# Patient Record
Sex: FEMALE | Race: WHITE | Employment: STUDENT | URBAN - METROPOLITAN AREA
[De-identification: names, ages, dates, MRNs, and addresses within clinical notes are randomized per-mention and may not be internally consistent; named-entity substitution may affect disease eponyms.]

---

## 2020-12-21 ENCOUNTER — HOSPITAL ENCOUNTER (EMERGENCY)
Age: 20
Discharge: HOME | End: 2020-12-21
Payer: MEDICAID

## 2020-12-21 VITALS
DIASTOLIC BLOOD PRESSURE: 61 MMHG | OXYGEN SATURATION: 100 % | SYSTOLIC BLOOD PRESSURE: 107 MMHG | TEMPERATURE: 98.7 F | HEART RATE: 76 BPM | RESPIRATION RATE: 18 BRPM

## 2020-12-21 LAB
A/G RATIO: 1.6 (ref 1.1–2.2)
ALBUMIN SERPL-MCNC: 4.9 G/DL (ref 3.4–5)
ALP BLD-CCNC: 67 U/L (ref 40–129)
ALT SERPL-CCNC: 16 U/L (ref 10–40)
AMORPHOUS: ABNORMAL /HPF
ANION GAP SERPL CALCULATED.3IONS-SCNC: 12 MMOL/L (ref 3–16)
AST SERPL-CCNC: 19 U/L (ref 15–37)
BACTERIA: ABNORMAL /HPF
BASOPHILS ABSOLUTE: 0 K/UL (ref 0–0.2)
BASOPHILS RELATIVE PERCENT: 0.6 %
BILIRUB SERPL-MCNC: 0.5 MG/DL (ref 0–1)
BILIRUBIN URINE: NEGATIVE
BLOOD, URINE: ABNORMAL
BUN BLDV-MCNC: 15 MG/DL (ref 7–20)
CALCIUM SERPL-MCNC: 10 MG/DL (ref 8.3–10.6)
CHLORIDE BLD-SCNC: 103 MMOL/L (ref 99–110)
CLARITY: ABNORMAL
CO2: 24 MMOL/L (ref 21–32)
COLOR: YELLOW
CREAT SERPL-MCNC: 0.8 MG/DL (ref 0.6–1.1)
EOSINOPHILS ABSOLUTE: 0 K/UL (ref 0–0.6)
EOSINOPHILS RELATIVE PERCENT: 0.5 %
GFR AFRICAN AMERICAN: >60
GFR NON-AFRICAN AMERICAN: >60
GLOBULIN: 3.1 G/DL
GLUCOSE BLD-MCNC: 93 MG/DL (ref 70–99)
GLUCOSE URINE: NEGATIVE MG/DL
HCG QUALITATIVE: NEGATIVE
HCT VFR BLD CALC: 44.3 % (ref 36–48)
HEMOGLOBIN: 15.4 G/DL (ref 12–16)
KETONES, URINE: NEGATIVE MG/DL
LEUKOCYTE ESTERASE, URINE: ABNORMAL
LYMPHOCYTES ABSOLUTE: 1.5 K/UL (ref 1–5.1)
LYMPHOCYTES RELATIVE PERCENT: 18.3 %
MCH RBC QN AUTO: 32.8 PG (ref 26–34)
MCHC RBC AUTO-ENTMCNC: 34.8 G/DL (ref 31–36)
MCV RBC AUTO: 94.4 FL (ref 80–100)
MICROSCOPIC EXAMINATION: YES
MONOCYTES ABSOLUTE: 0.7 K/UL (ref 0–1.3)
MONOCYTES RELATIVE PERCENT: 8 %
MUCUS: ABNORMAL /LPF
NEUTROPHILS ABSOLUTE: 6.1 K/UL (ref 1.7–7.7)
NEUTROPHILS RELATIVE PERCENT: 72.6 %
NITRITE, URINE: NEGATIVE
PDW BLD-RTO: 12.2 % (ref 12.4–15.4)
PH UA: 7.5 (ref 5–8)
PLATELET # BLD: 327 K/UL (ref 135–450)
PMV BLD AUTO: 7.8 FL (ref 5–10.5)
POTASSIUM REFLEX MAGNESIUM: 3.7 MMOL/L (ref 3.5–5.1)
PROTEIN UA: NEGATIVE MG/DL
RBC # BLD: 4.69 M/UL (ref 4–5.2)
RBC UA: ABNORMAL /HPF (ref 0–4)
SODIUM BLD-SCNC: 139 MMOL/L (ref 136–145)
SPECIFIC GRAVITY UA: 1.02 (ref 1–1.03)
TOTAL PROTEIN: 8 G/DL (ref 6.4–8.2)
URINE TYPE: ABNORMAL
UROBILINOGEN, URINE: 0.2 E.U./DL
WBC # BLD: 8.4 K/UL (ref 4–11)
WBC UA: ABNORMAL /HPF (ref 0–5)

## 2020-12-21 PROCEDURE — 2580000003 HC RX 258: Performed by: PHYSICIAN ASSISTANT

## 2020-12-21 PROCEDURE — 84703 CHORIONIC GONADOTROPIN ASSAY: CPT

## 2020-12-21 PROCEDURE — 96375 TX/PRO/DX INJ NEW DRUG ADDON: CPT

## 2020-12-21 PROCEDURE — 80053 COMPREHEN METABOLIC PANEL: CPT

## 2020-12-21 PROCEDURE — 81001 URINALYSIS AUTO W/SCOPE: CPT

## 2020-12-21 PROCEDURE — 87077 CULTURE AEROBIC IDENTIFY: CPT

## 2020-12-21 PROCEDURE — 99284 EMERGENCY DEPT VISIT MOD MDM: CPT

## 2020-12-21 PROCEDURE — 96365 THER/PROPH/DIAG IV INF INIT: CPT

## 2020-12-21 PROCEDURE — 6360000002 HC RX W HCPCS: Performed by: PHYSICIAN ASSISTANT

## 2020-12-21 PROCEDURE — 85025 COMPLETE CBC W/AUTO DIFF WBC: CPT

## 2020-12-21 PROCEDURE — 87086 URINE CULTURE/COLONY COUNT: CPT

## 2020-12-21 RX ORDER — IBUPROFEN 600 MG/1
600 TABLET ORAL EVERY 6 HOURS PRN
Qty: 30 TABLET | Refills: 0 | Status: SHIPPED | OUTPATIENT
Start: 2020-12-21 | End: 2021-06-25 | Stop reason: ALTCHOICE

## 2020-12-21 RX ORDER — METHOCARBAMOL 750 MG/1
750 TABLET, FILM COATED ORAL 3 TIMES DAILY PRN
Qty: 10 TABLET | Refills: 0 | Status: SHIPPED | OUTPATIENT
Start: 2020-12-21 | End: 2021-06-25 | Stop reason: ALTCHOICE

## 2020-12-21 RX ORDER — 0.9 % SODIUM CHLORIDE 0.9 %
1000 INTRAVENOUS SOLUTION INTRAVENOUS ONCE
Status: COMPLETED | OUTPATIENT
Start: 2020-12-21 | End: 2020-12-21

## 2020-12-21 RX ORDER — KETOROLAC TROMETHAMINE 30 MG/ML
30 INJECTION, SOLUTION INTRAMUSCULAR; INTRAVENOUS ONCE
Status: COMPLETED | OUTPATIENT
Start: 2020-12-21 | End: 2020-12-21

## 2020-12-21 RX ORDER — CEFDINIR 300 MG/1
300 CAPSULE ORAL 2 TIMES DAILY
Qty: 14 CAPSULE | Refills: 0 | Status: SHIPPED | OUTPATIENT
Start: 2020-12-21 | End: 2020-12-28

## 2020-12-21 RX ADMIN — SODIUM CHLORIDE 1000 ML: 9 INJECTION, SOLUTION INTRAVENOUS at 15:55

## 2020-12-21 RX ADMIN — KETOROLAC TROMETHAMINE 30 MG: 30 INJECTION, SOLUTION INTRAMUSCULAR at 15:55

## 2020-12-21 RX ADMIN — CEFTRIAXONE SODIUM 1 G: 1 INJECTION, POWDER, FOR SOLUTION INTRAMUSCULAR; INTRAVENOUS at 16:23

## 2020-12-21 ASSESSMENT — ENCOUNTER SYMPTOMS
NAUSEA: 0
ABDOMINAL PAIN: 0
BACK PAIN: 0
SHORTNESS OF BREATH: 0
COUGH: 0
EYES NEGATIVE: 1
VOMITING: 0

## 2020-12-21 ASSESSMENT — PAIN SCALES - GENERAL
PAINLEVEL_OUTOF10: 5
PAINLEVEL_OUTOF10: 5
PAINLEVEL_OUTOF10: 3

## 2020-12-21 ASSESSMENT — PAIN DESCRIPTION - PAIN TYPE: TYPE: ACUTE PAIN

## 2020-12-21 ASSESSMENT — PAIN DESCRIPTION - LOCATION: LOCATION: FLANK

## 2020-12-21 NOTE — LETTER
[facility]  ED  [address]  [address]  Phone: [phone]  Fax: [phone]        December 22, 2020     Patient: Mandie Jenkins   YOB: 2000   Date of Visit: 12/21/2020       To Whom It May Concern: It is my medical opinion that Mandie Jenkins may return to work on 2/24/20. If you have any questions or concerns, please don't hesitate to call.     Sincerely,

## 2020-12-21 NOTE — ED PROVIDER NOTES
[address] [facility]  ED  EMERGENCY DEPARTMENT ENCOUNTER        Pt Name: Jeyson Whitt  MRN: [id number]  [place] 2000  Date of evaluation: 12/21/2020  Provider: Helen Hanks PA-C  PCP: Demetri Piña MD  ED Attending: Caleb Rodrigues MD      This patient was not seen by the attending provider    History provided by the patient    CHIEF COMPLAINT:     Chief Complaint   Patient presents with    Flank Pain     right sided, \"possible kidney stone\". HISTORY OF PRESENT ILLNESS:      Jeyson Whitt is a [address] y.o. female who arrives to the ED by private vehicle. Patient is here describing right flank pain that is been present for about 4 days. It is getting worse. She describes it as \"kidney pain\". She has had some urinary frequency and some slight dysuria. She has not noted any gross hematuria and she denies any abnormal vaginal bleeding or discharge. She denies any chest pain, abdominal pain, nausea or vomiting. She is here suspicious that she could possibly have a kidney stone (though she has never had one in the past). She has not taken anything for symptom control cannot identify exacerbating or alleviating factors to symptoms. Nursing Notes were reviewed     REVIEW OF SYSTEMS:     Review of Systems   Constitutional: Negative for appetite change, chills and fever. HENT: Negative. Eyes: Negative. Respiratory: Negative for cough and shortness of breath. Cardiovascular: Negative for chest pain. Gastrointestinal: Negative for abdominal pain, nausea and vomiting. Genitourinary: Positive for dysuria, flank pain and frequency. Negative for hematuria, vaginal bleeding and vaginal discharge. Musculoskeletal: Negative for back pain, gait problem and neck pain. Skin: Negative for rash. Neurological: Negative for dizziness and headaches. All other systems reviewed and are negative. Except as noted above in the ROS, all other systems were reviewed and negative.          PAST K/uL    Eosinophils Absolute 0.0 0.0 - 0.6 K/uL    Basophils Absolute 0.0 0.0 - 0.2 K/uL   Comprehensive Metabolic Panel w/ Reflex to MG   Result Value Ref Range    Sodium 139 136 - 145 mmol/L    Potassium reflex Magnesium 3.7 3.5 - 5.1 mmol/L    Chloride 103 99 - 110 mmol/L    CO2 24 21 - 32 mmol/L    Anion Gap 12 3 - 16    Glucose 93 70 - 99 mg/dL    BUN 15 7 - 20 mg/dL    CREATININE 0.8 0.6 - 1.1 mg/dL    GFR Non-African American >60 >60    GFR African American >60 >60    Calcium 10.0 8.3 - 10.6 mg/dL    Total Protein 8.0 6.4 - 8.2 g/dL    Alb 4.9 3.4 - 5.0 g/dL    Albumin/Globulin Ratio 1.6 1.1 - 2.2    Total Bilirubin 0.5 0.0 - 1.0 mg/dL    Alkaline Phosphatase 67 40 - 129 U/L    ALT 16 10 - 40 U/L    AST 19 15 - 37 U/L    Globulin 3.1 g/dL   Urinalysis, reflex to microscopic   Result Value Ref Range    Color, UA Yellow Straw/Yellow    Clarity, UA SL CLOUDY (A) Clear    Glucose, Ur Negative Negative mg/dL    Bilirubin Urine Negative Negative    Ketones, Urine Negative Negative mg/dL    Specific Gravity, UA 1.025 1.005 - 1.030    Blood, Urine MODERATE (A) Negative    pH, UA 7.5 5.0 - 8.0    Protein, UA Negative Negative mg/dL    Urobilinogen, Urine 0.2 <2.0 E.U./dL    Nitrite, Urine Negative Negative    Leukocyte Esterase, Urine MODERATE (A) Negative    Microscopic Examination YES     Urine Type NotGiven    HCG Qualitative, Serum   Result Value Ref Range    hCG Qual Negative Detects HCG level >10 MIU/mL   Microscopic Urinalysis   Result Value Ref Range    Mucus, UA Rare (A) None Seen /LPF    WBC, UA 10-20 (A) 0 - 5 /HPF    RBC, UA 21-50 (A) 0 - 4 /HPF    Bacteria, UA 1+ (A) None Seen /HPF    Amorphous, UA 2+ /HPF           PROCEDURES:   N/A    CRITICAL CARE TIME:       None      CONSULTS:  None      EMERGENCY DEPARTMENT COURSE and DIFFERENTIAL DIAGNOSIS/MDM:   Vitals:    Vitals:    12/21/20 1515 12/21/20 1534 12/21/20 1537 12/21/20 1637   BP:  (!) 156/100  107/61   Pulse: 111 120  76   Resp:  18     Temp:   98.7 °F (37.1 °C)    TempSrc:   Oral    SpO2: 97% 100%  100%       Patient was given the following medications:  Medications   0.9 % sodium chloride bolus (1,000 mLs Intravenous New Bag 12/21/20 1555)   cefTRIAXone (ROCEPHIN) 1 g IVPB in 50 mL D5W minibag (1 g Intravenous New Bag 12/21/20 1623)   ketorolac (TORADOL) injection 30 mg (30 mg Intravenous Given 12/21/20 1555)         I have evaluated this patient in the ED. Old records were reviewed. Patient arrives to the ED describing right flank pain for 4 days as well as some urinary symptoms. She has mild right CVA tenderness on physical exam but otherwise unremarkable exam.  She arrives to the ED tachycardic but this quickly resolves once she settles in the room. I checked her multiple times and found her heart rate to be in the 70s. Urine is collected on the patient which initially looks infected with moderate blood and moderate leukocytes. Micro analysis shows just 1+ bacteria would not really know WBC or RBC. I am going to send a urine culture. CBC is normal with white count of 8.4, H&H 15.4 and 44.3  CMP normal  hCG negative  Patient received 1 L normal saline IV with Toradol 30 mg IV and is feeling better. If anything her pain is worse when she moves around and there may indeed be a musculoskeletal component. Due to the initial appearance of her urine dip I did order Rocephin 1 g IV. I am going to cover her with antibiotics for suspected developing urinary tract infection and possibly early pyelonephritis. I did warn her to come back if you develop worsening pain, fevers, nausea or vomiting. She acknowledges understanding of this. I do not see an indication to image her at this point. She does not meet sepsis criteria or in any way look acutely ill. Her urine does not show any hematuria and the microanalysis admittedly is less concerning for infection compared to the initial dip.   I will treat with NSAIDs and muscle relaxer possible musculoskeletal component and answered all questions for her before discharge  I estimate there is LOW risk for ACUTE APPENDICITIS, BOWEL OBSTRUCTION, CHOLECYSTITIS, DIVERTICULITIS, INCARCERATED HERNIA, PANCREATITIS, PELVIC INFLAMMATORY DISEASE, PERFORATED BOWEL or ULCER, PREGNANCY/ECTOPIC PREGNANCY, or TUBO-OVARIAN ABSCESS, thus I consider the discharge disposition reasonable. Also, there is no evidence or peritonitis, sepsis, or toxicity. Allen Blanco and I have discussed the diagnosis and risks, and we agree with discharging home to follow-up with their primary doctor. We also discussed returning to the Emergency Department immediately if new or worsening symptoms occur. We have discussed the symptoms which are most concerning (e.g., bloody stool, fever, changing or worsening pain, vomiting) that necessitate immediate return. FINAL IMPRESSION:      1. Pyelonephritis    2.  Right flank pain          DISPOSITION/PLAN:   DISPOSITION     DISCHARGE    PATIENT REFERRED TO:  Salvador Cheek MD  [address] [address] [address]  [phone]    Schedule an appointment as soon as possible for a visit       [facility]  [address]  Go to   If symptoms worsen--worsening pain, fevers, vomiting      DISCHARGE MEDICATIONS:  New Prescriptions    CEFDINIR (OMNICEF) 300 MG CAPSULE    Take 1 capsule by mouth 2 times daily for 7 days                  (Please note thatportions of this note were completed with a voice recognition program.  Efforts were made to edit the dictations, but occasionally words are mis-transcribed.)    Taylor Gonzáles PA-C (electronicallysigned)              Larry Gaytan, [address]  12/21/20 [id number]

## 2020-12-22 LAB
ORGANISM: ABNORMAL
URINE CULTURE, ROUTINE: ABNORMAL

## 2021-06-25 ENCOUNTER — OFFICE VISIT (OUTPATIENT)
Dept: FAMILY MEDICINE CLINIC | Age: 21
End: 2021-06-25
Payer: MEDICAID

## 2021-06-25 VITALS
WEIGHT: 163 LBS | HEART RATE: 100 BPM | HEIGHT: 65 IN | BODY MASS INDEX: 27.16 KG/M2 | OXYGEN SATURATION: 99 % | SYSTOLIC BLOOD PRESSURE: 120 MMHG | DIASTOLIC BLOOD PRESSURE: 82 MMHG

## 2021-06-25 DIAGNOSIS — E04.9 ENLARGED THYROID: ICD-10-CM

## 2021-06-25 DIAGNOSIS — R14.0 ABDOMINAL BLOATING: ICD-10-CM

## 2021-06-25 DIAGNOSIS — Z76.89 ENCOUNTER TO ESTABLISH CARE: Primary | ICD-10-CM

## 2021-06-25 DIAGNOSIS — Z30.42 ENCOUNTER FOR SURVEILLANCE OF INJECTABLE CONTRACEPTIVE: ICD-10-CM

## 2021-06-25 LAB
CONTROL: NORMAL
PREGNANCY TEST URINE, POC: NORMAL

## 2021-06-25 PROCEDURE — 96372 THER/PROPH/DIAG INJ SC/IM: CPT | Performed by: PHYSICIAN ASSISTANT

## 2021-06-25 PROCEDURE — 81025 URINE PREGNANCY TEST: CPT | Performed by: PHYSICIAN ASSISTANT

## 2021-06-25 PROCEDURE — 99385 PREV VISIT NEW AGE 18-39: CPT | Performed by: PHYSICIAN ASSISTANT

## 2021-06-25 RX ORDER — MEDROXYPROGESTERONE ACETATE 150 MG/ML
150 INJECTION, SUSPENSION INTRAMUSCULAR ONCE
Status: COMPLETED | OUTPATIENT
Start: 2021-06-25 | End: 2021-06-25

## 2021-06-25 RX ADMIN — MEDROXYPROGESTERONE ACETATE 150 MG: 150 INJECTION, SUSPENSION INTRAMUSCULAR at 09:08

## 2021-06-25 SDOH — ECONOMIC STABILITY: FOOD INSECURITY: WITHIN THE PAST 12 MONTHS, YOU WORRIED THAT YOUR FOOD WOULD RUN OUT BEFORE YOU GOT MONEY TO BUY MORE.: NEVER TRUE

## 2021-06-25 SDOH — ECONOMIC STABILITY: FOOD INSECURITY: WITHIN THE PAST 12 MONTHS, THE FOOD YOU BOUGHT JUST DIDN'T LAST AND YOU DIDN'T HAVE MONEY TO GET MORE.: NEVER TRUE

## 2021-06-25 ASSESSMENT — ENCOUNTER SYMPTOMS
SHORTNESS OF BREATH: 0
WHEEZING: 0
BLOOD IN STOOL: 0
ABDOMINAL PAIN: 0
DIARRHEA: 0
COUGH: 0
ABDOMINAL DISTENTION: 1
COLOR CHANGE: 0

## 2021-06-25 ASSESSMENT — SOCIAL DETERMINANTS OF HEALTH (SDOH): HOW HARD IS IT FOR YOU TO PAY FOR THE VERY BASICS LIKE FOOD, HOUSING, MEDICAL CARE, AND HEATING?: NOT HARD AT ALL

## 2021-06-25 ASSESSMENT — PATIENT HEALTH QUESTIONNAIRE - PHQ9
SUM OF ALL RESPONSES TO PHQ9 QUESTIONS 1 & 2: 0
SUM OF ALL RESPONSES TO PHQ QUESTIONS 1-9: 0
SUM OF ALL RESPONSES TO PHQ QUESTIONS 1-9: 0
1. LITTLE INTEREST OR PLEASURE IN DOING THINGS: 0
SUM OF ALL RESPONSES TO PHQ QUESTIONS 1-9: 0
2. FEELING DOWN, DEPRESSED OR HOPELESS: 0

## 2021-06-25 NOTE — PROGRESS NOTES
 Smokeless tobacco: Never Used   Vaping Use    Vaping Use: Never used   Substance and Sexual Activity    Alcohol use: No     Alcohol/week: 0.0 standard drinks    Drug use: No    Sexual activity: Yes     Partners: Male   Other Topics Concern    Not on file   Social History Narrative    Not on file     Social Determinants of Health     Financial Resource Strain: Low Risk     Difficulty of Paying Living Expenses: Not hard at all   Food Insecurity: No Food Insecurity    Worried About Running Out of Food in the Last Year: Never true    [address] in the Last Year: Never true   Transportation Needs:     Lack of Transportation (Medical):  Lack of Transportation (Non-Medical):    Physical Activity:     Days of Exercise per Week:     Minutes of Exercise per Session:    Stress:     Feeling of Stress :    Social Connections:     Frequency of Communication with Friends and Family:     Frequency of Social Gatherings with Friends and Family:     Attends Hoah[employer] Services:     Active Member of Clubs or Organizations:     Attends Club or Organization Meetings:     Marital Status:    Intimate Partner Violence:     Fear of Current or Ex-Partner:     Emotionally Abused:     Physically Abused:     Sexually Abused:         Family History   Problem Relation Age of Onset    Rheum Arthritis Mother        ADVANCE DIRECTIVE: N, <no information>    Vitals:    06/25/21 0836   BP: 120/82   Pulse: 100   SpO2: 99%   Weight: 163 lb (73.9 kg)   Height: 5' 5\" (1.651 m)     Estimated body mass index is 27.12 kg/m² as calculated from the following:    Height as of this encounter: 5' 5\" (1.651 m). Weight as of this encounter: 163 lb (73.9 kg). Physical Exam  Vitals reviewed. Constitutional:       Appearance: Normal appearance. HENT:      Head: Normocephalic and atraumatic. Neck:      Thyroid: Thyromegaly present. Cardiovascular:      Rate and Rhythm: Normal rate and regular rhythm.       Heart sounds: Normal heart sounds. Pulmonary:      Effort: Pulmonary effort is normal.      Breath sounds: Normal breath sounds. No wheezing or rhonchi. Abdominal:      General: Bowel sounds are normal.      Palpations: Abdomen is soft. Tenderness: There is no abdominal tenderness. Musculoskeletal:      Right lower leg: No edema. Left lower leg: No edema. Neurological:      Mental Status: She is alert and oriented to person, place, and time. Cranial Nerves: No cranial nerve deficit. Psychiatric:         Mood and Affect: Mood normal.         Behavior: Behavior normal.         Thought Content: Thought content normal.         Judgment: Judgment normal.         No flowsheet data found. Lab Results   Component Value Date    GLUCOSE 93 12/21/2020       The ASCVD Risk score (Marylu Murillo, et al., 2013) failed to calculate for the following reasons: The 2013 ASCVD risk score is only valid for ages 36 to 78      There is no immunization history on file for this patient. Health Maintenance   Topic Date Due    HPV vaccine (1 - 2-dose series) Never done    COVID-19 Vaccine (1) Never done    Hepatitis C screen  06/25/2022 (Originally 2000)    HIV screen  06/25/2022 (Originally 11/28/2015)    Chlamydia screen  06/25/2022 (Originally 11/28/2016)    Flu vaccine (Season Ended) 09/01/2021    DTaP/Tdap/Td vaccine (7 - Td or Tdap) 05/15/2023    Hepatitis B vaccine  Completed    Hib vaccine  Completed    Varicella vaccine  Completed    Meningococcal (ACWY) vaccine  Completed    Hepatitis A vaccine  Aged Out    Pneumococcal 0-64 years Vaccine  Aged Out          ASSESSMENT/PLAN:  1. Encounter to establish care        I reviewed her history    2. Enlarged thyroid  -     US THYROID; Future  -     Discussed the findings of exam, will further work up with [address]. I suspect a possible right sided cyst    3. Abdominal bloating  -     Celiac Screen with Reflex; Future    4.  Encounter for surveillance of injectable contraceptive  -     POCT urine pregnancy        -     Pt will need to stop her depo injection at the end of this year and switch to alternative therapy. She is aware of this    Return if symptoms worsen or fail to improve. An electronic signature was used to authenticate this note.     --SERGO Khan on 6/25/2021 at 9:18 AM

## 2021-06-25 NOTE — PATIENT INSTRUCTIONS
Patient Education        HPV (Human Papillomavirus) Vaccine Gardasil®: What You Need to Know  What is HPV? Genital human papillomavirus (HPV) is the most common sexually transmitted virus in the [place]. More than half of sexually active men and women are infected with HPV at some time in their lives. About 20 million Americans are currently infected, and about 6 million more get infected each year. HPV is usually spread through sexual contact. Most HPV infections don't cause any symptoms, and go away on their own. But HPV can cause cervical cancer in women. Cervical cancer is the 2nd leading cause of cancer deaths among women around the world. In the [place], about 12,000 women get cervical cancer every year and about 4,000 are expected to die from it. HPV is also associated with several less common cancers, such as vaginal and vulvar cancers in women, and anal and oropharyngeal (back of the throat, including base of tongue and tonsils) cancers in both men and women. HPV can also cause genital warts and warts in the throat. There is no cure for HPV infection, but some of the problems it causes can be treated. HPV vaccine-Why get vaccinated? The HPV vaccine you are getting is one of two vaccines that can be given to prevent HPV. It may be given to both males and females. This vaccine can prevent most cases of cervical cancer in females, if it is given before exposure to the virus. In addition, it can prevent vaginal and vulvar cancer in females, and genital warts and anal cancer in both males and females. Protection from HPV vaccine is expected to be long-lasting. But vaccination is not a substitute for cervical cancer screening. Women should still get regular Pap tests. Who should get this HPV vaccine and when?   HPV vaccine is given as a 3-dose series  · 1st Dose: Now  · 2nd Dose: 1 to 2 months after Dose 1  · 3rd Dose: 6 months after Dose 1  Additional (booster) doses are not recommended. Routine vaccination  · This HPV vaccine is recommended for girls and boys 6or 15years of age. It may be given starting at age 5. Why is HPV vaccine recommended at 6or 15years of age? HPV infection is easily acquired, even with only one sex partner. That is why it is important to get HPV vaccine before any sexual contact takes place. Also, response to the vaccine is better at this age than at older ages. Catch-up vaccination  This vaccine is recommended for the following people who have not completed the 3-dose series:  · Females 15 through 32years of age  · Males 15 through 24years of age  This vaccine may be given to men 25 through 32years of age who have not completed the 3-dose series. It is recommended for men through age 32 who have sex with men or whose immune system is weakened because of HIV infection, other illness, or medications. HPV vaccine may be given at the same time as other vaccines. Some people should not get HPV vaccine or should wait  · Anyone who has ever had a life-threatening allergic reaction to any component of HPV vaccine, or to a previous dose of HPV vaccine, should not get the vaccine. Tell your doctor if the person getting vaccinated has any severe allergies, including an allergy to yeast.  · HPV vaccine is not recommended for pregnant women. However, receiving HPV vaccine when pregnant is not a reason to consider terminating the pregnancy. Women who are breast feeding may get the vaccine. · People who are mildly ill when a dose of HPV vaccine is planned can still be vaccinated. People with a moderate or severe illness should wait until they are better. What are the risks from this vaccine? This HPV vaccine has been used in the [place] and around the world for about six years and has been very safe. However, any medicine could possibly cause a serious problem, such as a severe allergic reaction.  The risk of any vaccine causing a serious injury, or death, is extremely small. Life-threatening allergic reactions from vaccines are very rare. If they do occur, it would be within a few minutes to a few hours after the vaccination. Several mild to moderate problems are known to occur with this HPV vaccine. These do not last long and go away on their own. · Reactions in the arm where the shot was given:  ? Pain (about 8 people in 10)  ? Redness or swelling (about 1 person in 4)  · Fever  ? Mild (100°F) (about 1 person in 10)  ? Moderate (102°F) (about 1 person in 65)  · Other problems:  ? Headache (about 1 person in 3)  · Fainting: Brief fainting spells and related symptoms (such as jerking movements) can happen after any medical procedure, including vaccination. Sitting or lying down for about 15 minutes after a vaccination can help prevent fainting and injuries caused by falls. Tell your doctor if the patient feels dizzy or light-headed, or has vision changes or ringing in the ears. Like all vaccines, HPV vaccines will continue to be monitored for unusual or severe problems. What if there is a serious reaction? What should I look for? · Look for anything that concerns you, such as signs of a severe allergic reaction, very high fever, or behavior changes. Signs of a severe allergic reaction can include hives, swelling of the face and throat, difficulty breathing, a fast heartbeat, dizziness, and weakness. These would start a few minutes to a few hours after the vaccination. What should I do? · If you think it is a severe allergic reaction or other emergency that can't wait, call 9-1-1 or get the person to the nearest hospital. Otherwise, call your doctor. · Afterward, the reaction should be reported to the Vaccine Adverse Event Reporting System (VAERS). Your doctor might file this report, or you can do it yourself through the VAERS web site at www.vaers. hhs.gov, or by calling 8-535.333.2031. VAERS is only for reporting reactions.  They do not give medical advice. The National Vaccine Injury Compensation Program  The National Vaccine Injury Compensation Program (VICP) is a federal program that was created to compensate people who may have been injured by certain vaccines. Persons who believe they may have been injured by a vaccine can learn about the program and about filing a claim by calling 0-961.934.1005 or visiting the [employer] website at www.[facility].gov/vaccinecompensation. How can I learn more? · Ask your doctor. · Call your local or state health department. · Contact the Centers for Disease Control and Prevention (CDC):  ? Call 9-343.224.7793 (1-800-CDC-INFO) or  ? Visit the CDC's website at www.cdc.gov/vaccines. Vaccine Information Statement (Interim)  HPV Vaccine (Gardasil)  (5/17/2013)  [address] [id number]  Department of Health and Human Services  Centers for Disease Control and Prevention  Many Vaccine Information Statements are available in [place] and other languages. See www.immunize.org/vis. Muchas hojas de información sobre vacunas están disponibles en español y en otros idiomas. Visite www.immunize.org/vis. Care instructions adapted under license by [facility] ([facility]). If you have questions about a medical condition or this instruction, always ask your healthcare professional. [address] any warranty or liability for your use of this information.

## 2021-06-28 ENCOUNTER — NURSE ONLY (OUTPATIENT)
Dept: FAMILY MEDICINE CLINIC | Age: 21
End: 2021-06-28
Payer: MEDICAID

## 2021-06-28 ENCOUNTER — HOSPITAL ENCOUNTER (OUTPATIENT)
Dept: ULTRASOUND IMAGING | Age: 21
Discharge: HOME | End: 2021-06-28
Payer: MEDICAID

## 2021-06-28 DIAGNOSIS — R14.0 ABDOMINAL BLOATING: ICD-10-CM

## 2021-06-28 DIAGNOSIS — Z23 NEED FOR TUBERCULOSIS VACCINATION: Primary | ICD-10-CM

## 2021-06-28 DIAGNOSIS — E04.9 ENLARGED THYROID: ICD-10-CM

## 2021-06-28 LAB — IGA: 150 MG/DL (ref 70–400)

## 2021-06-28 PROCEDURE — 76536 US EXAM OF HEAD AND NECK: CPT

## 2021-06-28 PROCEDURE — 86580 TB INTRADERMAL TEST: CPT | Performed by: PHYSICIAN ASSISTANT

## 2021-06-29 LAB — TISSUE TRANSGLUTAMINASE IGA: <0.5 U/ML (ref 0–14)

## 2021-06-30 ENCOUNTER — TELEPHONE (OUTPATIENT)
Dept: FAMILY MEDICINE CLINIC | Age: 21
End: 2021-06-30

## 2021-06-30 ENCOUNTER — NURSE ONLY (OUTPATIENT)
Dept: FAMILY MEDICINE CLINIC | Age: 21
End: 2021-06-30

## 2021-06-30 DIAGNOSIS — Z11.1 ENCOUNTER FOR PPD SKIN TEST READING: Primary | ICD-10-CM

## 2021-06-30 LAB
INDURATION: 0
TB SKIN TEST: NORMAL

## 2021-06-30 NOTE — TELEPHONE ENCOUNTER
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 She is going to [employer]. She will pick it up when it is ready.

## 2021-07-06 ENCOUNTER — NURSE ONLY (OUTPATIENT)
Dept: FAMILY MEDICINE CLINIC | Age: 21
End: 2021-07-06
Payer: MEDICAID

## 2021-07-06 DIAGNOSIS — Z23 NEED FOR TUBERCULOSIS VACCINATION: Primary | ICD-10-CM

## 2021-07-06 PROCEDURE — 86580 TB INTRADERMAL TEST: CPT | Performed by: PHYSICIAN ASSISTANT

## 2021-07-08 LAB
INDURATION: NORMAL
TB SKIN TEST: NEGATIVE

## 2022-03-01 ENCOUNTER — NURSE TRIAGE (OUTPATIENT)
Dept: OTHER | Facility: CLINIC | Age: 22
End: 2022-03-01

## 2022-03-01 ENCOUNTER — TELEPHONE (OUTPATIENT)
Dept: FAMILY MEDICINE CLINIC | Age: 22
End: 2022-03-01

## 2022-03-01 NOTE — TELEPHONE ENCOUNTER
[facility]. Limited triage due to caller not with patient     Subjective: Caller Mom  states \"he called me on her lunch today. Always had anxiety when drives on interstate and real bad anxiety big exams. 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 Her heart rate was 150 (apple watch) , when standing in clinicals was in 40's . 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    \"     Current Symptoms: mom reports daughter heart rate is increased when driving to clinicals and schools. Mom reports she spoke with her at lunch time and patient was doing okay. Intermittent waves of heart rate going up and down. When anxious pt's mom reports heart rate up. Mom reports when the heart rate comes down gets to low feels weak like going to pass out and get sick then when heart rate comes back up where suppose to be it subsided. Mom reports last time she talked to her she was fine in the moment. Onset:mom reports any high stress situation this occurs such as driving on interstate     Associated Symptoms: increased anixety     Pain Severity: mom reports she gets tightness and pain in chest     Temperature: mom reports no fever     What has been tried: na    LMP: NA Pregnant: NA    Recommended disposition: Go to ED/[facility] Now (Or to Office with PCP Approval)    Care advice provided, patient verbalizes understanding; denies any other questions or concerns; instructed to call back for any new or worsening symptoms. Writer provided warm transfer to [employer]  at [facility] for further assessment and resume of care      Attention Provider: Thank you for allowing me to participate in the care of your patient. The patient was connected to triage in response to information provided to the ECC/PSC.   Please do not respond through this encounter as the response is not directed to a shared pool. Reason for Disposition   Patient sounds very sick or weak to the triager     Mom reporting patient having increased anxiety when driving to clinicals /school on interstate during these episodes has chest tightness and feels like can a pass out , once stressful situation is over will resolve.     Protocols used: ANXIETY AND PANIC ATTACK-ADULT-[place]

## 2022-03-01 NOTE — TELEPHONE ENCOUNTER
----- Message from Cheyenne Crump sent at 3/1/2022  1:51 PM EST -----  Subject: Message to Provider    QUESTIONS  Information for Provider? Pt would like to schedule a virtual appt for   anxiety and heart palpitations ect. Would like to speak to PCP as soon as   possible and possibly get some RX, tried to book appt on [employer] and the   earliest appt was on 3/27/22. Did transfer mother to Nurse Triage but   sending message just in case.   ---------------------------------------------------------------------------  --------------  CALL BACK INFO  What is the best way for the office to contact you? OK to leave message on   voicemail  Preferred Call Back Phone Number? [phone]  ---------------------------------------------------------------------------  --------------  SCRIPT ANSWERS  Relationship to Patient?  Self

## 2022-03-03 ENCOUNTER — OFFICE VISIT (OUTPATIENT)
Dept: FAMILY MEDICINE CLINIC | Age: 22
End: 2022-03-03
Payer: MEDICAID

## 2022-03-03 VITALS
DIASTOLIC BLOOD PRESSURE: 80 MMHG | SYSTOLIC BLOOD PRESSURE: 120 MMHG | BODY MASS INDEX: 27.12 KG/M2 | OXYGEN SATURATION: 100 % | HEART RATE: 97 BPM | WEIGHT: 163 LBS

## 2022-03-03 DIAGNOSIS — R07.9 CHEST PAIN, UNSPECIFIED TYPE: ICD-10-CM

## 2022-03-03 DIAGNOSIS — R00.0 TACHYCARDIA: Primary | ICD-10-CM

## 2022-03-03 LAB
ANION GAP SERPL CALCULATED.3IONS-SCNC: 14 MMOL/L (ref 3–16)
BUN BLDV-MCNC: 7 MG/DL (ref 7–20)
CALCIUM SERPL-MCNC: 9.5 MG/DL (ref 8.3–10.6)
CHLORIDE BLD-SCNC: 102 MMOL/L (ref 99–110)
CO2: 21 MMOL/L (ref 21–32)
CREAT SERPL-MCNC: 0.7 MG/DL (ref 0.6–1.1)
D DIMER: <200 NG/ML DDU (ref 0–229)
GFR AFRICAN AMERICAN: >60
GFR NON-AFRICAN AMERICAN: >60
GLUCOSE BLD-MCNC: 103 MG/DL (ref 70–99)
HCT VFR BLD CALC: 44 % (ref 36–48)
HEMOGLOBIN: 15 G/DL (ref 12–16)
MCH RBC QN AUTO: 32.4 PG (ref 26–34)
MCHC RBC AUTO-ENTMCNC: 34.2 G/DL (ref 31–36)
MCV RBC AUTO: 94.9 FL (ref 80–100)
PDW BLD-RTO: 12.8 % (ref 12.4–15.4)
PLATELET # BLD: 368 K/UL (ref 135–450)
PMV BLD AUTO: 7.7 FL (ref 5–10.5)
POTASSIUM SERPL-SCNC: 3.7 MMOL/L (ref 3.5–5.1)
RBC # BLD: 4.63 M/UL (ref 4–5.2)
SODIUM BLD-SCNC: 137 MMOL/L (ref 136–145)
TSH REFLEX: 2.34 UIU/ML (ref 0.27–4.2)
WBC # BLD: 5.2 K/UL (ref 4–11)

## 2022-03-03 PROCEDURE — 36415 COLL VENOUS BLD VENIPUNCTURE: CPT | Performed by: PHYSICIAN ASSISTANT

## 2022-03-03 PROCEDURE — G8419 CALC BMI OUT NRM PARAM NOF/U: HCPCS | Performed by: PHYSICIAN ASSISTANT

## 2022-03-03 PROCEDURE — G8484 FLU IMMUNIZE NO ADMIN: HCPCS | Performed by: PHYSICIAN ASSISTANT

## 2022-03-03 PROCEDURE — 1036F TOBACCO NON-USER: CPT | Performed by: PHYSICIAN ASSISTANT

## 2022-03-03 PROCEDURE — 93000 ELECTROCARDIOGRAM COMPLETE: CPT | Performed by: PHYSICIAN ASSISTANT

## 2022-03-03 PROCEDURE — G8427 DOCREV CUR MEDS BY ELIG CLIN: HCPCS | Performed by: PHYSICIAN ASSISTANT

## 2022-03-03 PROCEDURE — 99214 OFFICE O/P EST MOD 30 MIN: CPT | Performed by: PHYSICIAN ASSISTANT

## 2022-03-03 ASSESSMENT — ENCOUNTER SYMPTOMS
WHEEZING: 0
SHORTNESS OF BREATH: 1
CHEST TIGHTNESS: 1
COUGH: 0

## 2022-03-03 NOTE — PROGRESS NOTES
Ahmet Gallo (:  2000) is a 24 y.o. female,Established patient, here for evaluation of the following chief complaint(s):  Tachycardia (states HR is about 118 while sitting and if shes driving it goes up to 150 )         ASSESSMENT/PLAN:  1. Tachycardia  -     EKG 12 Lead; Future: normal  -     Basic Metabolic Panel  -     CBC  -     TSH with Reflex  -     D-Dimer, Quantitative  2. Chest pain, unspecified type  -     D-Dimer, Quantitative      Return if symptoms worsen or fail to improve. Subjective   SUBJECTIVE/OBJECTIVE:  HPI  The pt is here for evaluation of tachycardia  Symptoms started  Characteristics: chest tightness and sharp pain on the left side, occurs at rest and with activity  Frequency: daily symptoms to varying degrees  Associated symptoms: chest tightness, HR of 118 at rest, hard to catch her breath  Denies: lightheadedness, dizziness, swelling  Treatments tried: has increased water, recently cut gluten out of her diet, exercise regularly, cut back on soda  Risk factors: anxiety    Review of Systems   Constitutional: Negative for diaphoresis, fatigue and unexpected weight change. Respiratory: Positive for chest tightness and shortness of breath. Negative for cough and wheezing. Cardiovascular: Positive for chest pain and palpitations. Negative for leg swelling. Neurological: Negative for dizziness, light-headedness and headaches. Objective   Physical Exam  Vitals reviewed. Constitutional:       Appearance: Normal appearance. She is normal weight. Cardiovascular:      Rate and Rhythm: Normal rate and regular rhythm. Heart sounds: Normal heart sounds. Pulmonary:      Effort: Pulmonary effort is normal.      Breath sounds: Normal breath sounds. No wheezing or rhonchi. Musculoskeletal:      Right lower leg: No edema. Left lower leg: No edema. Neurological:      Mental Status: She is alert.                 An electronic signature was used to authenticate this note.     --SERGO Hartman

## 2022-03-04 DIAGNOSIS — R00.0 TACHYCARDIA: Primary | ICD-10-CM

## 2022-03-07 ENCOUNTER — HOSPITAL ENCOUNTER (OUTPATIENT)
Dept: NON INVASIVE DIAGNOSTICS | Age: 22
Discharge: HOME | End: 2022-03-07
Payer: MEDICAID

## 2022-03-07 DIAGNOSIS — R00.0 TACHYCARDIA: ICD-10-CM

## 2022-03-07 PROCEDURE — 93226 XTRNL ECG REC<48 HR SCAN A/R: CPT

## 2022-03-07 PROCEDURE — 93225 XTRNL ECG REC<48 HRS REC: CPT

## 2022-03-11 ENCOUNTER — PATIENT MESSAGE (OUTPATIENT)
Dept: FAMILY MEDICINE CLINIC | Age: 22
End: 2022-03-11

## 2022-03-11 LAB
ACQUISITION DURATION: NORMAL S
AVERAGE HEART RATE: 98 BPM
EKG DIAGNOSIS: NORMAL
HOLTER MAX HEART RATE: 160 BPM
HOOKUP DATE: NORMAL
HOOKUP TIME: NORMAL
MAX HEART RATE TIME/DATE: NORMAL
MIN HEART RATE TIME/DATE: NORMAL
MIN HEART RATE: 63 BPM
NUMBER OF QRS COMPLEXES: NORMAL
NUMBER OF SUPRAVENTRICULAR COUPLETS: 0
NUMBER OF SUPRAVENTRICULAR ECTOPICS: 7
NUMBER OF SUPRAVENTRICULAR ISOLATED BEATS: 7
NUMBER OF VENTRICULAR BIGEMINAL CYCLES: 0
NUMBER OF VENTRICULAR COUPLETS: 1
NUMBER OF VENTRICULAR ECTOPICS: 16

## 2022-03-14 ENCOUNTER — TELEMEDICINE (OUTPATIENT)
Dept: FAMILY MEDICINE CLINIC | Age: 22
End: 2022-03-14
Payer: MEDICAID

## 2022-03-14 DIAGNOSIS — R00.0 TACHYCARDIA: Primary | ICD-10-CM

## 2022-03-14 PROCEDURE — G8484 FLU IMMUNIZE NO ADMIN: HCPCS | Performed by: PHYSICIAN ASSISTANT

## 2022-03-14 PROCEDURE — G8419 CALC BMI OUT NRM PARAM NOF/U: HCPCS | Performed by: PHYSICIAN ASSISTANT

## 2022-03-14 PROCEDURE — 1036F TOBACCO NON-USER: CPT | Performed by: PHYSICIAN ASSISTANT

## 2022-03-14 PROCEDURE — G8427 DOCREV CUR MEDS BY ELIG CLIN: HCPCS | Performed by: PHYSICIAN ASSISTANT

## 2022-03-14 PROCEDURE — 99214 OFFICE O/P EST MOD 30 MIN: CPT | Performed by: PHYSICIAN ASSISTANT

## 2022-03-14 RX ORDER — METOPROLOL SUCCINATE 25 MG/1
25 TABLET, EXTENDED RELEASE ORAL DAILY
Qty: 30 TABLET | Refills: 5 | Status: SHIPPED | OUTPATIENT
Start: 2022-03-14 | End: 2022-07-29 | Stop reason: ALTCHOICE

## 2022-03-14 ASSESSMENT — ENCOUNTER SYMPTOMS
SHORTNESS OF BREATH: 0
WHEEZING: 0

## 2022-03-14 NOTE — PROGRESS NOTES
3/14/2022    TELEHEALTH EVALUATION -- Audio/Visual (During XWLBL-21 public health emergency)    HPI:    Prince Vasquez (:  2000) has requested an audio/video evaluation for the following concern(s):    Sinus tachycardia, confirmed on holter monitor. Pt reported today that she has also been having low HR at times where she is beating less than 60 BPM, typically after standing for long periods of time. Lowest FL on holter monitor was 58. Occurring during times of symptoms  Characteristics: chest tightness and sharp pain on the left side, occurs at rest and with activity  Frequency: daily symptoms to varying degrees  Associated symptoms: chest tightness, HR of 118 at rest, hard to catch her breath  Denies: lightheadedness, dizziness, swelling  Past treatments: none  Father diagnosed with MVP at age 16      Review of Systems   Constitutional: Negative for diaphoresis, fatigue and unexpected weight change. Respiratory: Negative for shortness of breath and wheezing. Cardiovascular: Negative for chest pain, palpitations and leg swelling. Neurological: Negative for dizziness, weakness and headaches. Prior to Visit Medications    Medication Sig Taking?  Authorizing Provider   metoprolol succinate (TOPROL XL) 25 MG extended release tablet Take 1 tablet by mouth daily Yes SERGO Ramirez   medroxyPROGESTERone (DEPO-PROVERA) 150 MG/ML injection Inject 150 mg into the muscle every 3 months Yes Historical Provider, MD       Social History     Tobacco Use    Smoking status: Never Smoker    Smokeless tobacco: Never Used   Vaping Use    Vaping Use: Never used   Substance Use Topics    Alcohol use: No     Alcohol/week: 0.0 standard drinks    Drug use: No        No Known Allergies    PHYSICAL EXAMINATION:  [ INSTRUCTIONS:  \"[x]\" Indicates a positive item  \"[]\" Indicates a negative item  -- DELETE ALL ITEMS NOT EXAMINED]  Vital Signs: (As obtained by patient/caregiver or practitioner observation)    Blood pressure-  Heart rate-    Respiratory rate- 14   Temperature- 98.6 Pulse oximetry-     Constitutional: [x] Appears well-developed and well-nourished [x] No apparent distress      [] Abnormal-   Mental status  [x] Alert and awake  [x] Oriented to person/place/time []Able to follow commands      Eyes:  EOM    [x]  Normal  [] Abnormal-  Sclera  [x]  Normal  [] Abnormal -         Discharge [x]  None visible  [] Abnormal -    HENT:   [x] Normocephalic, atraumatic. [] Abnormal   [x] Mouth/Throat: Mucous membranes are moist.     External Ears [x] Normal  [] Abnormal-     Neck: [x] No visualized mass     Pulmonary/Chest: [x] Respiratory effort normal.  [x] No visualized signs of difficulty breathing or respiratory distress        [] Abnormal-      Musculoskeletal:   [] Normal gait with no signs of ataxia         [] Normal range of motion of neck        [] Abnormal-       Neurological:        [x] No Facial Asymmetry (Cranial nerve 7 motor function) (limited exam to video visit)          [] No gaze palsy        [] Abnormal-         Skin:        [] No significant exanthematous lesions or discoloration noted on facial skin         [] Abnormal-            Psychiatric:       [] Normal Affect [] No Hallucinations        [] Abnormal-     Other pertinent observable physical exam findings-     ASSESSMENT/PLAN:  1. Tachycardia  -  Will start her on medication and follow up in a few weeks. Patient is interested in seeing cardiology since she has a family history of heart conditions as well as periodic drops in her [facility]. Discussed results of holter monitor  - Alison Monreal DO, Cardiology, [place] [id number]  - metoprolol succinate (TOPROL XL) 25 MG extended release tablet; Take 1 tablet by mouth daily  Dispense: 30 tablet; Refill: 5      Return in about 2 weeks (around 3/28/2022) for tachycardia. Maritza Prasad, was evaluated through a synchronous (real-time) audio-video encounter.  The patient (or guardian if applicable) is aware that this is a billable service, which includes applicable co-pays. This Virtual Visit was conducted with patient's (and/or legal guardian's) consent. The visit was conducted pursuant to the emergency declaration under the [facility], [address] [place] [address] authority and the [employer] Act. Patient identification was verified, and a caregiver was present when appropriate. The patient was located at home in a state where the provider was licensed to provide care. Total time spent on this encounter: Not billed by time    --SERGO Nevarez on 3/14/2022 at 5:00 PM    An electronic signature was used to authenticate this note.

## 2022-03-21 NOTE — PROGRESS NOTES
[facility]   Cardiac Consultation    Referring Provider:  SERGO Damon     Chief Complaint   Patient presents with    New Patient    Chest Pain     feels tight or like a stabbing or pressure    Shortness of Breath     with CP    Palpitations     feels like a fast pounding, she says she looks down and can see it beating      Maritza Prasad   2000     History of Present Illness:    Maritza Prasad is a 24 y.o. female who is here today as a NEW patient consult for cardiology, referred by SERGO Damon for tachycardia. She was previously gollowed by [facility] as a teenager for dizziness and sinus bradycardia. She wore a Holter monitor on 3/7/2022 which showed- tachycardia present for 44% of test.2. Diary entries of\"chest pain, dizzy, short of breath, heart beating out of chest\" with sinus tachycardia noted. Today she states she was seen by cardiology at the age of 12 due to syncope, dizziness, and low heart rate. She wore a Holter monitor but her family did not return it so she never received results. She was told to eat salty snacks and stay hydrated. She states she has chest pain which feels like a stabbing sensation. Started years ago - varies in intensity and frequency. Pain radiates throughout her chest but not elsewhere. Gets some occ assoc palpitations. Has at rest and with exercise. She states she also has palpitations with associated SOB. Heart rate can be low while standing but also randomly high. She states she has palpitations which feels like her heart is beating hard, occurs several times per day. Palpitations can occur while she is sitting or when she is active. She has not been working out as much due to her symptoms. She started Toprol around one week ago which has helped some.  Palpitations are worse at night, smart watch alerts her that her heart rate is running 120's at rest. 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 Nurse helping her noted her heart rate being in the 40's. She has some symptoms while wearing her monitor, not not as severe. She has had some dizziness after starting Metoprolol bit is tolerating it well. Patient currently denies any weight gain, edema, and syncope. Past Medical History:   has no past medical history on file. Surgical History:   has no past surgical history on file. Social History:   reports that she has never smoked. She has never used smokeless tobacco. She reports that she does not drink alcohol and does not use drugs. Family History:  family history includes Mitral Valve Prolapse in her father and paternal grandmother; Rheum Arthritis in her mother. Home Medications:  Prior to Admission medications    Medication Sig Start Date End Date Taking? Authorizing Provider   metoprolol succinate (TOPROL XL) 25 MG extended release tablet Take 1 tablet by mouth daily 3/14/22  Yes SERGO Nuñez   medroxyPROGESTERone (DEPO-PROVERA) 150 MG/ML injection Inject 150 mg into the muscle every 3 months   Yes Historical Provider, MD        Allergies:  Patient has no known allergies. Review of Systems:   · Constitutional: there has been no unanticipated weight loss. There's been no change in energy level, sleep pattern, or activity level. · Eyes: No visual changes or diplopia. No scleral icterus. · ENT: No Headaches, hearing loss or vertigo. No mouth sores or sore throat. · Cardiovascular: Reviewed in HPI  · Respiratory: No cough or wheezing, no sputum production. No hematemesis. · Gastrointestinal: No abdominal pain, appetite loss, blood in stools. No change in bowel or bladder habits. · Genitourinary: No dysuria, trouble voiding, or hematuria. · Musculoskeletal:  No gait disturbance, weakness or joint complaints. · Integumentary: No rash or pruritis. · Neurological: No headache, diplopia, change in muscle strength, numbness or tingling.  No change in gait, balance, coordination, mood, affect, memory, mentation, behavior. · Psychiatric: No anxiety, no depression. · Endocrine: No malaise, fatigue or temperature intolerance. No excessive thirst, fluid intake, or urination. No tremor. · Hematologic/Lymphatic: No abnormal bruising or bleeding, blood clots or swollen lymph nodes. · Allergic/Immunologic: No nasal congestion or hives. Physical Examination:    Vitals:    03/22/22 1235   BP: 94/64   Pulse: 85   SpO2: 99%        Constitutional and General Appearance: NAD   Respiratory:  · Normal excursion and expansion without use of accessory muscles  · Resp Auscultation: Normal breath sounds without dullness  Cardiovascular:  · The apical impulses not displaced  · Heart tones are crisp and normal  · Cervical veins are not engorged  · The carotid upstroke is normal in amplitude and contour without delay or bruit  · Normal S1S2, No S3, No Murmur  · Peripheral pulses are symmetrical and full  · There is no clubbing, cyanosis of the extremities. · No edema  · Femoral Arteries: 2+ and equal  · Pedal Pulses: 2+ and equal   Abdomen:  · No masses or tenderness  · Liver/Spleen: No Abnormalities Noted  Neurological/Psychiatric:  · Alert and oriented in all spheres  · Moves all extremities well  · Exhibits normal gait balance and coordination  · No abnormalities of mood, affect, memory, mentation, or behavior are noted    Holter monitor 3/7/2022  The rhythm was sinus with sinus arrhythmia. Average MI interval 0.16, average QRS duration 0.08. Tachycardia present for 44% of test.2. Diary entries of\"chest pain, dizzy, short of breath, heart beating out of chest\" with sinus tachycardia noted     EKG   Sinus  Rhythm   -  Negative precordial T-waves  -Normal for age. Assessment:   Chest pain - atypical  Shortness of breath - consider cardiac   Inappropriate sinus tachycardia - symptomatic.  Improved post metoprolol started  Hypotension - limits titration of metoprolol Palpitations - likely due to IST  History of sinus bradycardia   Dizziness   History of syncope   History of heart issues in family     Plan:  Stress echocardiogram- hold Metoprolol the night before and the morning of the test   Labs- magnesium level   2 week cardiac event monitor   Stay well hydrated   Cardiac medications reviewed including indications and pertinent side effects    Check blood pressure and heart rate at home a few times per week- keep a log with dates and times and bring to office visit   Regular exercise and following a healthy diet encouraged   Follow up with me in 6 weeks     Scribe's attestation: This note was scribed in the presence of Dr. Alanis Pompa M.D. By Oliverio Rodriguez RN    The scribes documentation has been prepared under my direction and personally reviewed by me in its entirety. I confirm that the note above accurately reflects all work, treatment, procedures, and medical decision making performed by me. Dr. Alanis Pompa MD    Thank you for allowing me to participate in the care of this individual.      Carrie Madrid.  Abraham Cranker, M.D., [facility]    /

## 2022-03-22 ENCOUNTER — TELEPHONE (OUTPATIENT)
Dept: CARDIOLOGY CLINIC | Age: 22
End: 2022-03-22

## 2022-03-22 ENCOUNTER — OFFICE VISIT (OUTPATIENT)
Dept: CARDIOLOGY CLINIC | Age: 22
End: 2022-03-22
Payer: MEDICAID

## 2022-03-22 VITALS
HEART RATE: 85 BPM | HEIGHT: 65 IN | DIASTOLIC BLOOD PRESSURE: 64 MMHG | WEIGHT: 166 LBS | OXYGEN SATURATION: 99 % | BODY MASS INDEX: 27.66 KG/M2 | SYSTOLIC BLOOD PRESSURE: 94 MMHG

## 2022-03-22 DIAGNOSIS — R42 DIZZINESS: ICD-10-CM

## 2022-03-22 DIAGNOSIS — R06.02 SOB (SHORTNESS OF BREATH): ICD-10-CM

## 2022-03-22 DIAGNOSIS — R00.0 TACHYCARDIA: Primary | ICD-10-CM

## 2022-03-22 DIAGNOSIS — R00.2 PALPITATIONS: ICD-10-CM

## 2022-03-22 DIAGNOSIS — R07.2 PRECORDIAL PAIN: ICD-10-CM

## 2022-03-22 DIAGNOSIS — R00.0 TACHYCARDIA: ICD-10-CM

## 2022-03-22 LAB — MAGNESIUM: 2.2 MG/DL (ref 1.8–2.4)

## 2022-03-22 PROCEDURE — G8419 CALC BMI OUT NRM PARAM NOF/U: HCPCS | Performed by: INTERNAL MEDICINE

## 2022-03-22 PROCEDURE — G8427 DOCREV CUR MEDS BY ELIG CLIN: HCPCS | Performed by: INTERNAL MEDICINE

## 2022-03-22 PROCEDURE — G8484 FLU IMMUNIZE NO ADMIN: HCPCS | Performed by: INTERNAL MEDICINE

## 2022-03-22 PROCEDURE — 99244 OFF/OP CNSLTJ NEW/EST MOD 40: CPT | Performed by: INTERNAL MEDICINE

## 2022-03-22 NOTE — TELEPHONE ENCOUNTER
Monitor placed by To be mailed to pt 3/22/22  Monitor company [employer]  Length of monitor [employer] 14 days  Monitor ordered by [employer]  Serial number   Kit ID   Activation successful prior to pt leaving office?  NA

## 2022-03-22 NOTE — PATIENT INSTRUCTIONS
Plan:  Stress echocardiogram- hold Metoprolol the night before and the morning of the test   Labs- magnesium level   2 week cardiac event monitor   Stay well hydrated   Cardiac medications reviewed including indications and pertinent side effects    Check blood pressure and heart rate at home a few times per week- keep a log with dates and times and bring to office visit   Regular exercise and following a healthy diet encouraged   Follow up with me in 6 weeks     Your provider has ordered testing for further evaluation. An order/prescription has been included in your paper work.  To schedule outpatient testing, contact Central Scheduling by calling [address] [place] [address] ([phone]).

## 2022-03-23 ENCOUNTER — TELEPHONE (OUTPATIENT)
Dept: CARDIOLOGY CLINIC | Age: 22
End: 2022-03-23

## 2022-03-30 ENCOUNTER — HOSPITAL ENCOUNTER (OUTPATIENT)
Dept: CARDIOLOGY | Age: 22
Discharge: HOME | End: 2022-03-30
Payer: MEDICAID

## 2022-03-30 DIAGNOSIS — R07.2 PRECORDIAL PAIN: ICD-10-CM

## 2022-03-30 DIAGNOSIS — R42 DIZZINESS: ICD-10-CM

## 2022-03-30 DIAGNOSIS — R00.0 TACHYCARDIA: ICD-10-CM

## 2022-03-30 DIAGNOSIS — R06.02 SOB (SHORTNESS OF BREATH): ICD-10-CM

## 2022-03-30 LAB
LV EF: 50 %
LVEF MODALITY: NORMAL

## 2022-03-30 PROCEDURE — 93351 STRESS TTE COMPLETE: CPT

## 2022-03-30 PROCEDURE — 93320 DOPPLER ECHO COMPLETE: CPT

## 2022-03-31 ENCOUNTER — TELEPHONE (OUTPATIENT)
Dept: CARDIOLOGY CLINIC | Age: 22
End: 2022-03-31

## 2022-04-29 PROCEDURE — 93228 REMOTE 30 DAY ECG REV/REPORT: CPT | Performed by: INTERNAL MEDICINE

## 2022-05-16 DIAGNOSIS — R00.2 PALPITATIONS: ICD-10-CM

## 2022-07-21 ENCOUNTER — PATIENT MESSAGE (OUTPATIENT)
Dept: FAMILY MEDICINE CLINIC | Age: 22
End: 2022-07-21

## 2022-07-21 NOTE — TELEPHONE ENCOUNTER
From: Ruthann Grant  To: Ramesh Alvarez  Sent: 7/21/2022 5:10 PM EDT  Subject: Weight gain    I believe I am retaining fluid all over my body and was wondering what I can do to help. I put on like 10 pounds in about 2 weeks after about the first month on this medication. My ankles seem a little swollen, my watch some days is really tight and others really loose, my right hand is currently larger than the left. Anyways Im not sure if this is bad or normal thank you!

## 2022-07-22 RX ORDER — PROPRANOLOL HYDROCHLORIDE 10 MG/1
10 TABLET ORAL 2 TIMES DAILY
Qty: 90 TABLET | Refills: 3 | Status: SHIPPED | OUTPATIENT
Start: 2022-07-22 | End: 2022-07-29 | Stop reason: ALTCHOICE

## 2022-07-29 ENCOUNTER — TELEPHONE (OUTPATIENT)
Dept: CARDIOLOGY CLINIC | Age: 22
End: 2022-07-29

## 2022-07-29 ENCOUNTER — OFFICE VISIT (OUTPATIENT)
Dept: CARDIOLOGY CLINIC | Age: 22
End: 2022-07-29
Payer: MEDICAID

## 2022-07-29 ENCOUNTER — TELEPHONE (OUTPATIENT)
Dept: FAMILY MEDICINE CLINIC | Age: 22
End: 2022-07-29

## 2022-07-29 VITALS
OXYGEN SATURATION: 98 % | SYSTOLIC BLOOD PRESSURE: 116 MMHG | HEIGHT: 65 IN | DIASTOLIC BLOOD PRESSURE: 62 MMHG | BODY MASS INDEX: 29.49 KG/M2 | HEART RATE: 81 BPM | WEIGHT: 177 LBS

## 2022-07-29 DIAGNOSIS — Z87.898 HISTORY OF SYNCOPE: ICD-10-CM

## 2022-07-29 DIAGNOSIS — R42 DIZZINESS: ICD-10-CM

## 2022-07-29 DIAGNOSIS — R00.2 PALPITATION: Primary | ICD-10-CM

## 2022-07-29 DIAGNOSIS — R00.0 TACHYCARDIA: ICD-10-CM

## 2022-07-29 PROCEDURE — 99203 OFFICE O/P NEW LOW 30 MIN: CPT | Performed by: INTERNAL MEDICINE

## 2022-07-29 PROCEDURE — 93000 ELECTROCARDIOGRAM COMPLETE: CPT | Performed by: INTERNAL MEDICINE

## 2022-07-29 ASSESSMENT — ENCOUNTER SYMPTOMS
STRIDOR: 0
RIGHT EYE: 0
HEMATEMESIS: 0
SHORTNESS OF BREATH: 0
LEFT EYE: 0
HEMATOCHEZIA: 0
WHEEZING: 0

## 2022-07-29 NOTE — TELEPHONE ENCOUNTER
Spoke with the practice manager regarding the telephone encounter and she stated to call the patients mother for more information regarding the situation. I spoke with the patients mother. She states that the patient felt as if she was being dismissed during this office visit. She states she does not know why the patient was taken off of Metoprolol when she has been taking it for the last 6 months. I reviewed the office note with the patients mother and explained I was present to scribe in the room with Dr. Heena Briones. The mom explained that the patient began experiencing weight gain and fluid retention so she was taken off of Metoprolol and placed on Propranolol by her PCP. She stated that her daughter was in the ER 07/24/2022 and taken off of Propranolol due to lightheadedness and hypotension. She stated that her daughter was told to stay off of Metoprolol and Propranolol until she was able to see Dr. Sharmila Arroyo (who she was originally supposed to see but he was unavailable on the schedule). She stated that Monday she did not take any beta blockers and began experiencing palpitations and a fast heart rate so she began taking Metoprolol Tuesday, Wednesday, and Thursday. She reports that she advised her daughter to not take her dose 07/29/2022 in case Dr. Heena Briones wanted to make changes. She reported that her daughter was originally placed on Metoprolol 6 months ago due to HR's of 160's - 200's while wearing cardiac event monitor. She reported that her daughter would be walking on the treadmill at [employer] and the machine would shut off because her heart rate was too high. She reported that her daughters high HR's do not only occur with exertion but with relaxation like sitting and doing homework, or watching TV. She reported that on the two week event monitor her daughter had already been on Metoprolol due to the results of the 24 hour Holter monitor.  She stated that Dr. Luis Sherwood told the patient that he did not want to titrate the Metoprolol 25 mg due to her \"high's being high, but her low's were low. \" I told the patient's mother that this situation has been made aware of to the practice manager and she V/U.

## 2022-07-29 NOTE — PROGRESS NOTES
burden. She reported chest pain, dizziness, shortness of breath and \"heart beating out of chest\" correlating with sinus tachycardia. Patient wore another cardiac event monitor from 04/05/2022 to 04/19/2022 which demonstrated predominately SR with an average HR of 73 (). PAC burden <0.01%, PVC burden 0.05%. Office Visit (Dr. [facility], 03/22/2022)    History of Present Illness:              Mare Singh is a 24 y.o. female who is here today as a NEW patient consult for cardiology, referred by SERGO Ricketts for tachycardia. She was previously [facility]ollowed by [facility] a teenager for dizziness and sinus bradycardia. She wore a Holter monitor on 3/7/2022 which showed- tachycardia present for 44% of test.2. Diary entries of\"chest pain, dizzy, short of breath, heart beating out of chest\" with sinus tachycardia noted. Today she states she was seen by cardiology at the age of 12 due to syncope, dizziness, and low heart rate. She wore a Holter monitor but her family did not return it so she never received results. She was told to eat salty snacks and stay hydrated. She states she has chest pain which feels like a stabbing sensation. Started years ago - varies in intensity and frequency. Pain radiates throughout her chest but not elsewhere. Gets some occ assoc palpitations. Has at rest and with exercise. She states she also has palpitations with associated SOB. Heart rate can be low while standing but also randomly high. She states she has palpitations which feels like her heart is beating hard, occurs several times per day. Palpitations can occur while she is sitting or when she is active. She has not been working out as much due to her symptoms. She started Toprol around one week ago which has helped some.  Palpitations are worse at night, smart watch alerts her that her heart rate is running 120's at rest. 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 Nurse helping her noted her heart rate being in the 40's. She has some symptoms while wearing her monitor, not not as severe. She has had some dizziness after starting Metoprolol bit is tolerating it well. Patient currently denies any weight gain, edema, and syncope. Office Visit (EP clinic, 07/29/2022)  She presents today to EP clinic for evaluation of palpitations and lightheadedness. She reports she began passing out with no warning with her HR in the 40's. One of these episodes was witnessed by the nurse and [facility] She states that she wore a monitor after that and her parents never sent it back in for results. She reports she began eating more salt and that helped with these issues. She states that recently she purchased an apple watch and that's when she noticed having \"dizzy spells\" a few times a week. She reports that she experiences chest pain and tightness lasting a few seconds. She admits to being dehydrated and not drinking fluids. She reports that her dizziness does occur with positional changes. She states she has not passed out in 3 or 4 years. She reports she is an occasional  social drinker and does not drink caffeine. A year or two ago she noticed that while trying to work out at the gym she felt as if her chest was going to explode. Patient denies current edema, sob, or syncope. She reports she was told by her PCP to discontinue taking cardiac medications until seeing the electrophysiology physician. Review of Systems  Review of Systems   Constitutional: Negative for malaise/fatigue, weight gain and weight loss. HENT:  Negative for nosebleeds and stridor. Eyes:  Negative for vision loss in left eye and vision loss in right eye. Cardiovascular:  Positive for chest pain and palpitations. Negative for dyspnea on exertion, leg swelling and syncope.    Respiratory:  Negative for shortness of breath and wheezing. Hematologic/Lymphatic: Negative for bleeding problem. Does not bruise/bleed easily. Skin:  Negative for itching and rash. Musculoskeletal:  Negative for joint pain and joint swelling. Gastrointestinal:  Negative for hematemesis and hematochezia. Genitourinary:  Negative for dysuria and hematuria. Neurological:  Positive for dizziness and light-headedness. Psychiatric/Behavioral:  Negative for altered mental status. The patient is not nervous/anxious. History reviewed. No pertinent past medical history. Social History     Socioeconomic History    Marital status: Single     Spouse name: Not on file    Number of children: Not on file    Years of education: Not on file    Highest education level: Not on file   Occupational History    Not on file   Tobacco Use    Smoking status: Never    Smokeless tobacco: Never   Vaping Use    Vaping Use: Every day    Substances: Nicotine   Substance and Sexual Activity    Alcohol use: No     Alcohol/week: 0.0 standard drinks    Drug use: No    Sexual activity: Yes     Partners: Male   Other Topics Concern    Not on file   Social History Narrative    Not on file     Social Determinants of Health     Financial Resource Strain: Not on file   Food Insecurity: Not on file   Transportation Needs: Not on file   Physical Activity: Not on file   Stress: Not on file   Social Connections: Not on file   Intimate Partner Violence: Not on file   Housing Stability: Not on file         Family History   Problem Relation Age of Onset    Rheum Arthritis Mother     Mitral Valve Prolapse Father     Mitral Valve Prolapse Paternal Grandmother          Objective:       /62   Pulse 81   Ht 5' 5\" (1.651 m)   Wt 177 lb (80.3 kg)   SpO2 98%   BMI 29.45 kg/m²     Physical Exam  Constitutional:       Appearance: Normal appearance. HENT:      Head: Normocephalic and atraumatic.       Right Ear: External ear normal.      Left Ear: External ear normal.      Nose: Nose normal. No rhinorrhea. Eyes:      General: No scleral icterus. Conjunctiva/sclera: Conjunctivae normal.   Cardiovascular:      Rate and Rhythm: Normal rate and regular rhythm. Pulmonary:      Effort: Pulmonary effort is normal.      Breath sounds: Normal breath sounds. Abdominal:      General: There is no distension. Musculoskeletal:         General: No swelling or deformity. Cervical back: Normal range of motion and neck supple. Skin:     General: Skin is warm and dry. Neurological:      General: No focal deficit present. Mental Status: She is alert and oriented to person, place, and time. Psychiatric:         Mood and Affect: Mood normal.         Behavior: Behavior normal.       ECG Interpretation: (Date: 10/27/22)  Rhythm:Sinus Rhythm  Rate: 70 BPM        ECG Interpretation:  (Date: 03/03/2022)  Rhythm: Sinus rhythm  Rate: 74 BPM  PAC's / PVC's present: No        Echocardiogram  (Date: 03/30/2022)  Summary   Normal stress echocardiogram study. Stress Test (Date: 03/30/2022)  Summary   Normal stress echocardiogram study. Current Medications     Current Outpatient Medications   Medication Sig Dispense Refill    propranolol (INDERAL) 10 MG tablet Take 1 tablet by mouth in the morning and 1 tablet before bedtime. (Patient not taking: Reported on 7/29/2022) 90 tablet 3    metoprolol succinate (TOPROL XL) 25 MG extended release tablet Take 1 tablet by mouth daily (Patient not taking: Reported on 7/29/2022) 30 tablet 5    medroxyPROGESTERone (DEPO-PROVERA) 150 MG/ML injection Inject 150 mg into the muscle every 3 months       No current facility-administered medications for this visit.            Lab Review     Lab Results   Component Value Date    CREATININE 0.7 03/03/2022    BUN 7 03/03/2022     03/03/2022    K 3.7 03/03/2022     03/03/2022    CO2 21 03/03/2022       Lab Results   Component Value Date    WBC 5.2 03/03/2022    HGB 15.0 03/03/2022    HCT 44.0 03/03/2022 MCV 94.9 03/03/2022     03/03/2022       No results found for: TSHFT4, TSH    No results found for: BNP    I, Raymondo Phalen, RN, am scribing for and in the presence of Dr. Kyaw Jordan. 07/29/22 11:13 AM   Raymondo Phalen, RN

## 2022-07-29 NOTE — PATIENT INSTRUCTIONS
Plan:     Encourage 2 -3 L of water intake to increase hydration status  Encourage increasing exercise to be able to tolerate higher intensity exertion.    Discontinue Metoprolol at this time  Follow up with me as needed

## 2022-07-29 NOTE — TELEPHONE ENCOUNTER
Patient's mom Swapnil Klinefelter called upset. Patient saw Dr. Carol Ann Qureshi earlier and he had told her that she needed to lose weight basically. Patient is very upset now. Patient Stopped DEPO last month. Mom Wants to know why she would be gaining weight and why BP has been running high?

## 2022-07-29 NOTE — TELEPHONE ENCOUNTER
Pt was seen at [facility] today by Dori Soto for wilbert , pts mother(on Hippa form) called [facility] very upset and stated pt felt very intimidated and dismissed by Dori Soto before the appt was over. He advised pt her body is maturing and she needs to drink water and exercise. Pt was upset [facility] suggested pt stop taking metropolol which she has been on for 6 mos, how would [facility] like to proceed w/ this ?

## 2022-07-29 NOTE — TELEPHONE ENCOUNTER
I have not seen the patient since March. If she would like to schedule an in office visit for evaluation and discussion she may do so. Her blood pressure at that appointment was very good. The cardiologist that she met with today has not finished their note so I am not able to see what was discussed but it does look like her blood pressure looked good in office at that time as well.  Her weight has gone up ten lbs since her last visit with me but again, I haven't seen her so I can't say why for sure without an appointment and discussion

## 2022-07-29 NOTE — TELEPHONE ENCOUNTER
Pt would like your opinion if she should stop or continue toprol 25 mg daily?      [facility]/22/[facility]

## 2022-08-01 NOTE — TELEPHONE ENCOUNTER
Attempted to reach patient. No answer. Left VM to call back to schedule next available appointment with Vincent Hernandez.

## 2022-08-02 NOTE — PROGRESS NOTES
[facility]   Cardiac Consultation    Referring Provider:  SERGO Tillman     Chief Complaint   Patient presents with    Follow-up    Tachycardia        Cleopatra Bailey   2000     History of Present Illness:    Cleopatra Bailey is a 24 y.o. female who is here today to follow up and to review testing. She was initially seen as a new patient at the request SERGO Tillman for achycardia. [facility] and [facility] bradycardia. She wore a Holter monitor on 3/7/2022 which showed- tachycardia present for 44% of test.2. Diary entries of\"chest pain, dizzy, short of breath, heart beating out of chest\" with sinus tachycardia noted. At her initial visit she reported being seen by cardiology since the age of 12 ue to syncope, dizziness, and low heart rate. She wore a Holter monitor but her family did not return it so she never received results. She was told to eat salty snacks and stay hydrated. She also reported having chest pain and palpitations. Stress echocardiogram 3/30/2022 was normal, EF 55%. Cardiac event monitor 4/15-4/19/2022- Predominately NSR with symptoms, low PVC burden. She was evaluated by the EP team on 7/29/2022 and was encouraged to stay well hydrated and increase exercise. Metoprolol stopped. Today she states she has been feeling well but continues to have palpitations. She states she started to have face and leg swelling on Metoprolol so this was stopped and changed to Atenolol. She states she did not tolerate Atenolol so this was stopped due to headaches and nausea. She continues to feel her heart race which makes hr feel anxious. She states she has had issues at the gym due to her heart rate being too high and work out machines shutting off due to safety features. Overall she seemed to feel better on Metoprolol bit stopped it due to swelling. She feels like she is not able to exercise off the medication.  Occ dizzy unchanged. Patient currently denies any chest pain, weight gain, shortness of breath, and syncope. Past Medical History:   has no past medical history on file. Surgical History:   has no past surgical history on file. Social History:   reports that she has never smoked. She uses smokeless tobacco. She reports that she does not drink alcohol and does not use drugs. Family History:  family history includes Mitral Valve Prolapse in her father and paternal grandmother; Rheum Arthritis in her mother. Home Medications:  Prior to Admission medications    Not on File        Allergies:  Patient has no known allergies. Review of Systems:   Constitutional: there has been no unanticipated weight loss. There's been no change in energy level, sleep pattern, or activity level. Eyes: No visual changes or diplopia. No scleral icterus. ENT: No Headaches, hearing loss or vertigo. No mouth sores or sore throat. Cardiovascular: Reviewed in HPI  Respiratory: No cough or wheezing, no sputum production. No hematemesis. Gastrointestinal: No abdominal pain, appetite loss, blood in stools. No change in bowel or bladder habits. Genitourinary: No dysuria, trouble voiding, or hematuria. Musculoskeletal:  No gait disturbance, weakness or joint complaints. Integumentary: No rash or pruritis. Neurological: No headache, diplopia, change in muscle strength, numbness or tingling. No change in gait, balance, coordination, mood, affect, memory, mentation, behavior. Psychiatric: No anxiety, no depression. Endocrine: No malaise, fatigue or temperature intolerance. No excessive thirst, fluid intake, or urination. No tremor. Hematologic/Lymphatic: No abnormal bruising or bleeding, blood clots or swollen lymph nodes. Allergic/Immunologic: No nasal congestion or hives.     Physical Examination:    Vitals:    08/03/22 1328   BP: 106/78   Pulse: 80   SpO2: 98%          Constitutional and General Appearance: NAD Respiratory:  Normal excursion and expansion without use of accessory muscles  Resp Auscultation: Normal breath sounds without dullness  Cardiovascular: The apical impulses not displaced  Heart tones are crisp and normal  Cervical veins are not engorged  The carotid upstroke is normal in amplitude and contour without delay or bruit  Normal S1S2, No S3, No Murmur  Peripheral pulses are symmetrical and full  There is no clubbing, cyanosis of the extremities. No edema  Femoral Arteries: 2+ and equal  Pedal Pulses: 2+ and equal   Abdomen:  No masses or tenderness  Liver/Spleen: No Abnormalities Noted  Neurological/Psychiatric:  Alert and oriented in all spheres  Moves all extremities well  Exhibits normal gait balance and coordination  No abnormalities of mood, affect, memory, mentation, or behavior are noted    Holter monitor 3/7/2022  The rhythm was sinus with sinus arrhythmia. Average PA interval 0.16, average QRS duration 0.08. Tachycardia present for 44% of test.2. Diary entries of\"chest pain, dizzy, short of breath, heart beating out of chest\" with sinus tachycardia noted     EKG   Sinus  Rhythm   -  Negative precordial T-waves  -Normal for age. Stress echocardiogram 3/10/2022  Echo   Baseline resting echocardiogram shows normal global LV systolic function   with an ejection fraction of 55% and uniform myocardial segmental wall   motion. Following stress there was uniform augmentation of all myocardial   segments with appropriate hyperdynamic LV systolic response to stress. Summary   Normal stress echocardiogram study. Cardiac event monitor 4/15-4/19/2022  Predominately NSR with symptoms, low PVC burden     Assessment:   Chest pain - atypical. None since last OV  Inappropriate sinus tachycardia - symptomatic. Improved post metoprolol started but stopped due to swelling. Did not tolerate propranolol.    Hypotension - limits titration of metoprolol   Palpitations - likely due to IST and PVCs   History of sinus bradycardia   Dizziness - unchanged  History of syncope     Plan:  Try restarting metoprolol at 12.5 mg daily  Discussed CaBlk - she is reluctant to try due to potential side effects   You can also try taking over the counter magnesium 400-500 mg daily    Cardiac medications reviewed including indications and pertinent side effects. Medication list updated at this visit. Check blood pressure and heart rate at home a few times per week- keep a log with dates and times and bring to office visit   Regular exercise and following a healthy diet encouraged   Call us in one month and let us know how you feel   Follow up with me in 3 months     Scribe's attestation: This note was scribed in the presence of Dr. Siomara Covington M.D. By Edu Dixon RN    The scribes documentation has been prepared under my direction and personally reviewed by me in its entirety. I confirm that the note above accurately reflects all work, treatment, procedures, and medical decision making performed by me. Dr. Siomara Covington MD      Thank you for allowing me to participate in the care of this individual.      Taurus Danielle.  Mora Hutson M.D., [facility]    /

## 2022-08-03 ENCOUNTER — OFFICE VISIT (OUTPATIENT)
Dept: CARDIOLOGY CLINIC | Age: 22
End: 2022-08-03
Payer: MEDICAID

## 2022-08-03 ENCOUNTER — OFFICE VISIT (OUTPATIENT)
Dept: FAMILY MEDICINE CLINIC | Age: 22
End: 2022-08-03
Payer: MEDICAID

## 2022-08-03 VITALS
DIASTOLIC BLOOD PRESSURE: 80 MMHG | BODY MASS INDEX: 29.45 KG/M2 | WEIGHT: 177 LBS | SYSTOLIC BLOOD PRESSURE: 110 MMHG | HEART RATE: 74 BPM | OXYGEN SATURATION: 98 %

## 2022-08-03 VITALS
WEIGHT: 177 LBS | BODY MASS INDEX: 29.49 KG/M2 | HEART RATE: 80 BPM | HEIGHT: 65 IN | DIASTOLIC BLOOD PRESSURE: 78 MMHG | OXYGEN SATURATION: 98 % | SYSTOLIC BLOOD PRESSURE: 106 MMHG

## 2022-08-03 DIAGNOSIS — R00.2 PALPITATIONS: Primary | ICD-10-CM

## 2022-08-03 DIAGNOSIS — R63.5 WEIGHT GAIN: Primary | ICD-10-CM

## 2022-08-03 DIAGNOSIS — R63.5 WEIGHT GAIN: ICD-10-CM

## 2022-08-03 LAB
T4 FREE: 1.3 NG/DL (ref 0.9–1.8)
TSH SERPL DL<=0.05 MIU/L-ACNC: 1.11 UIU/ML (ref 0.27–4.2)
VITAMIN D 25-HYDROXY: 42.8 NG/ML

## 2022-08-03 PROCEDURE — G8427 DOCREV CUR MEDS BY ELIG CLIN: HCPCS | Performed by: PHYSICIAN ASSISTANT

## 2022-08-03 PROCEDURE — 99213 OFFICE O/P EST LOW 20 MIN: CPT | Performed by: PHYSICIAN ASSISTANT

## 2022-08-03 PROCEDURE — 1036F TOBACCO NON-USER: CPT | Performed by: PHYSICIAN ASSISTANT

## 2022-08-03 PROCEDURE — G8419 CALC BMI OUT NRM PARAM NOF/U: HCPCS | Performed by: PHYSICIAN ASSISTANT

## 2022-08-03 PROCEDURE — 99214 OFFICE O/P EST MOD 30 MIN: CPT | Performed by: INTERNAL MEDICINE

## 2022-08-03 RX ORDER — METOPROLOL SUCCINATE 25 MG/1
12.5 TABLET, EXTENDED RELEASE ORAL DAILY
Qty: 90 TABLET | Refills: 1
Start: 2022-08-03

## 2022-08-03 SDOH — ECONOMIC STABILITY: FOOD INSECURITY: WITHIN THE PAST 12 MONTHS, YOU WORRIED THAT YOUR FOOD WOULD RUN OUT BEFORE YOU GOT MONEY TO BUY MORE.: NEVER TRUE

## 2022-08-03 SDOH — ECONOMIC STABILITY: HOUSING INSECURITY
IN THE LAST 12 MONTHS, WAS THERE A TIME WHEN YOU DID NOT HAVE A STEADY PLACE TO SLEEP OR SLEPT IN A SHELTER (INCLUDING NOW)?: NO

## 2022-08-03 SDOH — ECONOMIC STABILITY: TRANSPORTATION INSECURITY
IN THE PAST 12 MONTHS, HAS THE LACK OF TRANSPORTATION KEPT YOU FROM MEDICAL APPOINTMENTS OR FROM GETTING MEDICATIONS?: NO

## 2022-08-03 SDOH — ECONOMIC STABILITY: HOUSING INSECURITY: IN THE LAST 12 MONTHS, HOW MANY PLACES HAVE YOU LIVED?: 0

## 2022-08-03 SDOH — ECONOMIC STABILITY: FOOD INSECURITY: WITHIN THE PAST 12 MONTHS, THE FOOD YOU BOUGHT JUST DIDN'T LAST AND YOU DIDN'T HAVE MONEY TO GET MORE.: NEVER TRUE

## 2022-08-03 SDOH — ECONOMIC STABILITY: TRANSPORTATION INSECURITY
IN THE PAST 12 MONTHS, HAS LACK OF TRANSPORTATION KEPT YOU FROM MEETINGS, WORK, OR FROM GETTING THINGS NEEDED FOR DAILY LIVING?: NO

## 2022-08-03 SDOH — ECONOMIC STABILITY: INCOME INSECURITY: IN THE LAST 12 MONTHS, WAS THERE A TIME WHEN YOU WERE NOT ABLE TO PAY THE MORTGAGE OR RENT ON TIME?: NO

## 2022-08-03 ASSESSMENT — PATIENT HEALTH QUESTIONNAIRE - PHQ9
5. POOR APPETITE OR OVEREATING: 1
8. MOVING OR SPEAKING SO SLOWLY THAT OTHER PEOPLE COULD HAVE NOTICED. OR THE OPPOSITE, BEING SO FIGETY OR RESTLESS THAT YOU HAVE BEEN MOVING AROUND A LOT MORE THAN USUAL: 1
SUM OF ALL RESPONSES TO PHQ QUESTIONS 1-9: 6
SUM OF ALL RESPONSES TO PHQ9 QUESTIONS 1 & 2: 0
2. FEELING DOWN, DEPRESSED OR HOPELESS: 0
10. IF YOU CHECKED OFF ANY PROBLEMS, HOW DIFFICULT HAVE THESE PROBLEMS MADE IT FOR YOU TO DO YOUR WORK, TAKE CARE OF THINGS AT HOME, OR GET ALONG WITH OTHER PEOPLE: 0
4. FEELING TIRED OR HAVING LITTLE ENERGY: 1
1. LITTLE INTEREST OR PLEASURE IN DOING THINGS: 0
SUM OF ALL RESPONSES TO PHQ QUESTIONS 1-9: 6
9. THOUGHTS THAT YOU WOULD BE BETTER OFF DEAD, OR OF HURTING YOURSELF: 0
6. FEELING BAD ABOUT YOURSELF - OR THAT YOU ARE A FAILURE OR HAVE LET YOURSELF OR YOUR FAMILY DOWN: 0
SUM OF ALL RESPONSES TO PHQ QUESTIONS 1-9: 6
SUM OF ALL RESPONSES TO PHQ QUESTIONS 1-9: 6
7. TROUBLE CONCENTRATING ON THINGS, SUCH AS READING THE NEWSPAPER OR WATCHING TELEVISION: 0
3. TROUBLE FALLING OR STAYING ASLEEP: 3
DEPRESSION UNABLE TO ASSESS: FUNCTIONAL CAPACITY MOTIVATION LIMITS ACCURACY

## 2022-08-03 ASSESSMENT — ANXIETY QUESTIONNAIRES
1. FEELING NERVOUS, ANXIOUS, OR ON EDGE: 1
7. FEELING AFRAID AS IF SOMETHING AWFUL MIGHT HAPPEN: 0
2. NOT BEING ABLE TO STOP OR CONTROL WORRYING: 0
5. BEING SO RESTLESS THAT IT IS HARD TO SIT STILL: 0
IF YOU CHECKED OFF ANY PROBLEMS ON THIS QUESTIONNAIRE, HOW DIFFICULT HAVE THESE PROBLEMS MADE IT FOR YOU TO DO YOUR WORK, TAKE CARE OF THINGS AT HOME, OR GET ALONG WITH OTHER PEOPLE: NOT DIFFICULT AT ALL
4. TROUBLE RELAXING: 0
6. BECOMING EASILY ANNOYED OR IRRITABLE: 0
3. WORRYING TOO MUCH ABOUT DIFFERENT THINGS: 0
GAD7 TOTAL SCORE: 1

## 2022-08-03 ASSESSMENT — SOCIAL DETERMINANTS OF HEALTH (SDOH): HOW HARD IS IT FOR YOU TO PAY FOR THE VERY BASICS LIKE FOOD, HOUSING, MEDICAL CARE, AND HEATING?: NOT HARD AT ALL

## 2022-08-03 NOTE — PROGRESS NOTES
Patsy Hester (:  2000) is a 24 y.o. female,Established patient, here for evaluation of the following chief complaint(s):  Weight Gain and Hypertension         ASSESSMENT/PLAN:  1. Weight gain  -     TSH; Future  -     T4, Free; Future  -     Vitamin D 25 Hydroxy; Future  -     INSULIN FREE & TOTAL; Future        -     Possibly medication related, rule out other causes    No follow-ups on file. Subjective   SUBJECTIVE/OBJECTIVE:  HPI  The pt is here for evaluation of weight gain  She has gained 10 lbs over the last year  She historically has been very active and trim and is concerned by this  Activity: typically very active, weight   Diet: healthy diet, limits sodium intake, no caffeine  Has stopped her depo provera this  which historically has decreased her weight but not this time  Had some fluid retention with use of metoprolol- improved since stopping medication    Review of Systems   Constitutional:  Positive for unexpected weight change. Negative for activity change, appetite change and fatigue. Cardiovascular:  Positive for palpitations. Negative for leg swelling. Endocrine: Negative for cold intolerance, heat intolerance, polydipsia, polyphagia and polyuria. Skin:  Negative for pallor. Objective   Physical Exam  Vitals reviewed. Constitutional:       Appearance: Normal appearance. Neck:      Thyroid: No thyromegaly. Cardiovascular:      Rate and Rhythm: Normal rate and regular rhythm. Heart sounds: Normal heart sounds. Pulmonary:      Effort: Pulmonary effort is normal.      Breath sounds: Normal breath sounds. Neurological:      Mental Status: She is alert. An electronic signature was used to authenticate this note.     --SERGO Read

## 2022-08-03 NOTE — PATIENT INSTRUCTIONS
Plan:  Try restarting metoprolol at 12.5 mg daily   You can also try taking over the counter magnesium 400-500 mg daily    Cardiac medications reviewed including indications and pertinent side effects. Medication list updated at this visit.    Check blood pressure and heart rate at home a few times per week- keep a log with dates and times and bring to office visit   Regular exercise and following a healthy diet encouraged   Call us in one month and let us know how you feel   Follow up with me in 3 months

## 2022-08-07 LAB
INSULIN FREE: 31 UIU/ML (ref 3–25)
INSULIN: 38 UIU/ML (ref 3–25)

## 2022-09-13 ENCOUNTER — NURSE ONLY (OUTPATIENT)
Dept: FAMILY MEDICINE CLINIC | Age: 22
End: 2022-09-13

## 2022-09-13 DIAGNOSIS — Z02.0 SCHOOL PHYSICAL EXAM: Primary | ICD-10-CM

## 2022-09-13 DIAGNOSIS — Z02.0 SCHOOL PHYSICAL EXAM: ICD-10-CM

## 2022-09-14 ENCOUNTER — TELEPHONE (OUTPATIENT)
Dept: RHEUMATOLOGY | Age: 22
End: 2022-09-14

## 2022-09-14 NOTE — TELEPHONE ENCOUNTER
Samantha Lawrence, patient's mother called. She is getting upset and states her daughter needs to see a rheumatologist. 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 [facility] They cannot get her [facility] until after her [facility] and their cut off is 25. They declined to see her. She called [facility] and was told only patients with a [facility] pcp can schedule. Her PCP is in [facility]. She is wondering if  would evaluate her daughter? She was advised that the office doesn't see or treat for either of those Dx's. She asked me to send a message to [address] [place] [address]. she has seen 2 cardiologists who both advised a Rheumatologist is the only one who can work up and dx ELDS. She is at her wits end. Wondering if there is someone that  would recommend. I did mention her to look up or speak with someone at [facility]?

## 2022-09-15 ENCOUNTER — PATIENT MESSAGE (OUTPATIENT)
Dept: FAMILY MEDICINE CLINIC | Age: 22
End: 2022-09-15

## 2022-09-15 DIAGNOSIS — M25.50 HYPERMOBILITY ARTHRALGIA: Primary | ICD-10-CM

## 2022-09-15 NOTE — TELEPHONE ENCOUNTER
I called over to this clinic and they confirmed that they are currently only accepting internal referrals. She stated that a referral can be placed by a [facility] PCP, [facility] Rheumatologist, or a [facility] Pain Management Provider.

## 2022-09-15 NOTE — TELEPHONE ENCOUNTER
Staff, can we please call this clinic and confirm that they won't see her without being a [facility] pt?     [address], [address] [place] [address], [address] [place] [address]  Get Directions  [phone]

## 2022-09-15 NOTE — TELEPHONE ENCOUNTER
From: Patsy Hester  To: Maggi Hawkins  Sent: 9/15/2022 11:11 AM EDT  Subject: Rheumatologist referral     Delmer Ward,  I was instructed to see a rheumatologist for the treatment/diagnosis of EDS. I am having difficulty locating someone who can do that, is there any way you could help me with that?   Thank you,  [facility]

## 2022-09-18 LAB
QUANTI TB GOLD PLUS: NEGATIVE
QUANTI TB1 MINUS NIL: 0 IU/ML (ref 0–0.34)
QUANTI TB2 MINUS NIL: 0 IU/ML (ref 0–0.34)
QUANTIFERON MITOGEN: >10 IU/ML
QUANTIFERON NIL: 0.02 IU/ML

## 2022-09-19 NOTE — PROGRESS NOTES
Shanon Antonio came into the office for a TB test. After discussion on when she would need to return for the TB read she opted for the quantiferon lab draw.

## 2022-09-20 ENCOUNTER — HOSPITAL ENCOUNTER (OUTPATIENT)
Dept: PHYSICAL THERAPY | Age: 22
Setting detail: THERAPIES SERIES
Discharge: HOME | End: 2022-09-20
Payer: MEDICAID

## 2022-09-20 PROCEDURE — 97161 PT EVAL LOW COMPLEX 20 MIN: CPT

## 2022-09-20 PROCEDURE — 97112 NEUROMUSCULAR REEDUCATION: CPT

## 2022-09-20 PROCEDURE — 97110 THERAPEUTIC EXERCISES: CPT

## 2022-09-20 NOTE — FLOWSHEET NOTE
[address] [place] [address] and Sports Rehabilitation[address], [address] [place] [address] [address]  Phone: [phone]   Fax:     [phone]      Physical Therapy Treatment Note/ Progress Report:           Date:  2022    Patient Name:  Suraj Field    :  2000  MRN: [id number]  Restrictions/Precautions:    Medical/Treatment Diagnosis Information:  Diagnosis: M25.50 (ICD-10-CM) - Hypermobility arthralgia  Treatment Diagnosis: Hypermobility, weakness  Insurance/Certification information:   [facility]  Physician Information:  SERGO Suggs  Has the plan of care been signed (Y/N):        []  Yes  []  No     Date of Patient follow up with Physician:     Assessment Summary: Vitaliy Herrera is a 24 y.o. female reporting to OP PT with c/c of general pain through back and hips primairly which has been occurring since . Pt is noted to have good ROM. General weakness through hips and core.         Date Range for reporting period:  Beginning   22  Ending       Recertification will be due (POC Duration  / 90 days whichever is less): 22          Visit # Insurance Allowable Auth Required   In Person  30 []  Yes     []  No    [facility]   []  Yes     []  No    Total 1             Functional Scale: FOTO 61    Date assessed:       Latex Allergy:  [x]NO      []YES  Preferred Language for Healthcare:   [x]English       []other:      Pain level:  4/10     SUBJECTIVE:  see eval    OBJECTIVE: see eval      RESTRICTIONS/PRECAUTIONS: None    Exercises/Interventions: HEP code: [id number]  Therapeutic Ex (22506) HEP 22     Warm-up       Rec bike              TABLE       SLR x 10x2 B     Bridge x x20     Hip adduction x x20     PPT x x20     SL CS x X15 B, 5\"     Quadruped hip extension x X20 B            SEATED                                                 STANDING Manual (82577): None    Therapeutic Exercise and NMR EXR  [x] (44748) Provided verbal/tactile cueing for activities related to strengthening, flexibility, endurance, ROM for improvements in LE, proximal hip, and core control with self care, mobility, lifting, ambulation. [x] (22273) Provided verbal/tactile cueing for activities related to improving balance, coordination, kinesthetic sense, posture, motor skill, proprioception to assist with LE, proximal hip, and core control in self-care, mobility, lifting, ambulation and eccentric single leg control.      NMR and Therapeutic Activities:    [x] (85986 or 69441) Provided verbal/tactile cueing for activities related to improving balance, coordination, kinesthetic sense, posture, motor skill, proprioception and motor activation to allow for proper function of core, proximal hip and LE with self-care and ADLs and functional mobility.   [] (71876) Gait Re-education- Provided training and instruction to the patient for proper LE, core and proximal hip recruitment and positioning and eccentric body weight control with ambulation re-education including up and down stairs     Home Exercise Program:    [x] (22545) Reviewed/Progressed HEP activities related to strengthening, flexibility, endurance, ROM of core, proximal hip and LE for functional self-care, mobility, lifting and ambulation/stair navigation   [] (66918) Reviewed/Progressed HEP activities related to improving balance, coordination, kinesthetic sense, posture, motor skill, proprioception of core, proximal hip and LE for self-care, mobility, lifting, and ambulation/stair navigation      Manual Treatments:  PROM / STM / Oscillations-Mobs:  G-I, II, III, IV (PA's, Inf., Post.)  [x] (64615) Provided manual therapy to mobilize LE, proximal hip and/or LS spine soft tissue/joints for the purpose of modulating pain, promoting relaxation, increasing ROM, reducing/eliminating soft tissue swelling/inflammation/restriction, improving soft tissue extensibility and allowing for proper ROM for normal function with self-care, mobility, lifting and ambulation. Modalities:     [] GAME READY (VASO)- for significant edema, swelling, pain control. Charges:  Timed Code Treatment Minutes: 25   Total Treatment Minutes:  40   BWC:  TE TIME:  NMR TIME:  MANUAL TIME:   TA  UNTIMED MINUTES:  Medicare Total:   15  10      15        [x] EVAL (LOW) 58178 (typically 20 minutes face-to-face)  [] EVAL (MOD) 97476 (typically 30 minutes face-to-face)  [] EVAL (HIGH) 96358 (typically 45 minutes face-to-face)  [] RE-EVAL     [x] ZR(40726) 1     [] IONTO  [x] NMR (36235) 1     [] VASO  [] Manual (98772) x     [] Dry Needle:  [] TA x      [] Mech Traction (72689)  [] ES(attended) (60034)      [] ES (un) (12090):        GOALS:     Patient stated goal: Reduce pain     Therapist goals for Patient:   Short Term Goals: To be achieved in: 2 weeks  1. Independent in HEP and progression per patient tolerance, in order to prevent re-injury. [] Progressing: [] Met: [] Not Met: [] Adjusted      2. Patient will have a decrease in pain of NRPS to <4/10 facilitate improvement in movement, function, and ADLs as indicated by Functional Deficits. [] Progressing: [] Met: [] Not Met: [] Adjusted      Long Term Goals: To be achieved in: 8 weeks  1. Pt will improve FOTO to 65 or more, indicating improved functional capacity. [] Progressing: [] Met: [] Not Met: [] Adjusted      2. Patient will demonstrate an increase in Strength to hip IR/ER/abduction to 5/5 allow for proper functional mobility as indicated by patients Functional Deficits. [] Progressing: [] Met: [] Not Met: [] Adjusted      3. Patient will return to functional walking activities with NRPS of </= 1/10.    [] Progressing: [] Met: [] Not Met: [] Adjusted             ASSESSMENT:  See eval    Patient received education on their current pathology and how their condition effects them with their functional activities. Patient understood discussion and questions were answered. Patient understands their activity limitations and understands rational for treatment progression. Pt educated on plan of care and HEP, if worsening symptoms to d/c that exercise. Return to Play: (if applicable)   []  Stage 1: Intro to Strength   []  Stage 2: Return to Run and Strength   []  Stage 3: Return to Jump and Strength   []  Stage 4: Dynamic Strength and Agility   []  Stage 5: Sport Specific Training     []  Ready to Return to Play, Meets All Above Stages   []  Not Ready for Return to Sports   Comments:                               PLAN: See eval  [x] Continue per plan of care [] Alter current plan (see comments above)  [] Plan of care initiated [] Hold pending MD visit [] Discharge      Electronically signed by:  Juve Hough PT    Note: If patient does not return for scheduled/ recommended follow up visits, this note will serve as a discharge from care along with most recent update on progress.

## 2022-09-20 NOTE — PROGRESS NOTES
[address] [place] [address] and Sports Rehabilitation, [address], [address] [place] [address] [address]  Phone: [phone]   Fax: [phone]    Date: 2022          Patient Name; :  Jelly Bella; 2000   Dx: M25.50 (ICD-10-CM) - Hypermobility arthralgia      Physician: SERGO Deleon        Total PT Visits:      Measures Previous Current   Pain (0-10)     FOTO (0-100)  High number is more function           Assessment:        Prognosis for POC: [] Good [] Fair  [] Poor      Patient requires continued skilled intervention: [] Yes  [] No        Plan & Recommendations:  [] Continue rehabilitation due to objective improvement and continued functional deficits with frequency and duration:   [] Progress toward  []GAP, []Work Conditioning, []Independent HEP   [] Discharge due to   [] All goals achieved, [] Maximized \"medical necessity\" [] No subjective or objective improvements      Electronically signed by:  Radha Reid, PT  Therapy Plan of Care Re-Certification  This patient has been re-evaluated for physical therapy services and for therapy to continue, Medicare, Medicaid and other insurances require periodic physician review of the treatment plan. Please review the above re-evaluation and verify that you agree with plan of care as established above by signing the attached document and return it to our office or note changes to established plan below  [] Follow treatment plan as above [] Discontinue physical therapy  [] Change plan to:                                 __________________________________________________    Physician Signature:____________________________________ Date:____________  By signing above, therapists plan is approved by physician    If you have any questions or concerns, please don't hesitate to call.   Thank you for your referral.

## 2022-09-20 NOTE — PLAN OF CARE
Questionnaire did not trigger screening.   [] Yes, Patient intake triggered further evaluation      [] C-SSRS Screening completed  [] PCP notified via Plan of Care  [] Emergency services notified     OBJECTIVE:     ROM RIGHT LEFT   Hip Flexion 120 120   Hip Abduction 45 45   Hip Extension     Hip Internal Rotation     Hip External Rotation          Knee Extension     Knee Flexion          Ankle Dorsiflexion     Ankle Plantarflexion     Ankle Inversion     Ankle Eversion          Popliteal angle     Rectus femoris          Strength  RIGHT LEFT   Hip Flexors     Hip Abductors     Hip Extension     Hip External Rotation     Hip Internal Rotation          Knee Extension     Knee Flexion          Ankle Dorsiflexion     Ankle Plantarflexion     Ankle Inversion     Ankle Eversion            Reflexes/Sensation:    [x]Dermatomes/Myotomes intact    []Reflexes equal and normal bilaterally NT   []Other:    Joint mobility:    [x]Normal    []Hypo   []Hyper    Palpation: no ttp    Functional Mobility/Transfers: INdependent    Posture: WNL    Bandages/Dressings/Incisions: None    Gait: (include devices/WB status) WNL    Orthopedic Special Tests: Neg FADIR, GANESH,                        [x] Patient history, allergies, meds reviewed. Medical chart reviewed. See intake form. Review Of Systems (ROS):  [x]Performed Review of systems (Integumentary, CardioPulmonary, Neurological) by intake and observation. Intake form has been scanned into medical record. Patient has been instructed to contact their primary care physician regarding ROS issues if not already being addressed at this time.       Co-morbidities/Complexities (which will affect course of rehabilitation):   []None           Arthritic conditions   []Rheumatoid arthritis (M05.9)  []Osteoarthritis (M19.91)   Cardiovascular conditions   []Hypertension (I10)  []Hyperlipidemia (E78.5)  []Angina pectoris (I20)  []Atherosclerosis (I70)   Musculoskeletal conditions   []Disc pathology []Congenital spine pathologies   []Prior surgical intervention  []Osteoporosis (M81.8)  []Osteopenia (M85.8)   Endocrine conditions   []Hypothyroid (E03.9)  []Hyperthyroid Gastrointestinal conditions   []Constipation (S07.76)   Metabolic conditions   []Morbid obesity (E66.01)  []Diabetes type 1(E10.65) or 2 (E11.65)   []Neuropathy (G60.9)     Pulmonary conditions   []Asthma (J45)  []Coughing   []COPD (J44.9)   Psychological Disorders  []Anxiety (F41.9)  []Depression (F32.9)   []Other:   []Other:          Barriers to/and or personal factors that will affect rehab potential:              []Age  []Sex              []Motivation/Lack of Motivation                        []Co-Morbidities              []Cognitive Function, education/learning barriers              []Environmental, home barriers              []profession/work barriers  []past PT/medical experience  []other:  Justification: None    Falls Risk Assessment (30 days):   [x] Falls Risk assessed and no intervention required. [] Falls Risk assessed and Patient requires intervention due to being higher risk   TUG score (>12s at risk):     [] Falls education provided, including           Functional Questionnaire: FOTO 61        ASSESSMENT: Maik Burgos is a 24 y.o. female reporting to OP PT with c/c of general pain through back and hips primairly which has been occurring since 2016. Pt is noted to have good ROM. General weakness through hips and core.           Functional Impairments:     []Noted lumbar/proximal hip/LE joint hypomobility   []Decreased LE functional ROM   []Decreased core/proximal hip strength and neuromuscular control   [x]Decreased LE functional strength   [x]Reduced balance/proprioceptive control   []other:      Functional Activity Limitations (from functional questionnaire and intake)   []Reduced ability to tolerate prolonged functional positions   []Reduced ability or difficulty with changes of positions or transfers between positions   []Reduced ability to maintain good posture and demonstrate good body mechanics with sitting, bending, and lifting   []Reduced ability to sleep   [] Reduced ability or tolerance with driving and/or computer work   [x]Reduced ability to perform lifting, carrying tasks   [x]Reduced ability to squat   [x]Reduced ability to forward bend   []Reduced ability to ambulate prolonged functional periods/distances/surfaces   []Reduced ability to ascend/descend stairs   []Reduced ability to run, hop, cut or jump   []other:    Participation Restrictions   []Reduced participation in self care activities   []Reduced participation in home management activities   [x]Reduced participation in work activities   [x]Reduced participation in social activities. []Reduced participation in sport/recreation activities. Classification :    []Signs/symptoms consistent with post-surgical status including decreased ROM, strength and function.    []Signs/symptoms consistent with joint sprain/strain   []Signs/symptoms consistent with patella-femoral syndrome   []Signs/symptoms consistent with knee OA/hip OA   []Signs/symptoms consistent with internal derangement of knee/Hip   []Signs/symptoms consistent with functional hip weakness/NMR control      []Signs/symptoms consistent with tendinitis/tendinosis    []signs/symptoms consistent with pathology which may benefit from Dry needling      [x]other: weakness    Prognosis/Rehab Potential:      []Excellent   []Good    [x]Fair   []Poor    Tolerance of evaluation/treatment:    []Excellent   [x]Good    []Fair   []Poor    Physical Therapy Evaluation Complexity Justification  [x] A history of present problem with:  [] no personal factors and/or comorbidities that impact the plan of care;  [x]1-2 personal factors and/or comorbidities that impact the plan of care  []3 personal factors and/or comorbidities that impact the plan of care  [x] An examination of body systems using standardized tests and measures addressing any of the following: body structures and functions (impairments), activity limitations, and/or participation restrictions;:  [x] a total of 1-2 or more elements   [] a total of 3 or more elements   [] a total of 4 or more elements   [x] A clinical presentation with:  [x] stable and/or uncomplicated characteristics   [] evolving clinical presentation with changing characteristics  [] unstable and unpredictable characteristics;   [x] Clinical decision making of [] low, [] moderate, [] high complexity using standardized patient assessment instrument and/or measurable assessment of functional outcome. [x] EVAL (LOW) 54901 (typically 20 minutes face-to-face)  [] EVAL (MOD) 88216 (typically 30 minutes face-to-face)  [] EVAL (HIGH) 50987 (typically 45 minutes face-to-face)  [] RE-EVAL     PLAN:   Frequency/Duration:  2 days per week for 8 Weeks:  Interventions:  [x]  Therapeutic exercise including: strength training, ROM, for Lower extremity and core   [x]  NMR activation and proprioception for LE, Glutes and Core   [x]  Manual therapy as indicated for LE, Hip and spine to include: Dry Needling/IASTM, STM, PROM, Gr I-IV mobilizations. [x] Modalities as needed that may include: thermal agents, E-stim, Biofeedback, US, iontophoresis as indicated  [x] Patient education on joint protection, postural re-education, activity modification, progression of HEP. HEP instruction: [id number]      GOALS:  Patient stated goal: Reduce pain    Therapist goals for Patient:   Short Term Goals: To be achieved in: 2 weeks  1. Independent in HEP and progression per patient tolerance, in order to prevent re-injury. [] Progressing: [] Met: [] Not Met: [] Adjusted     2. Patient will have a decrease in pain of NRPS to <4/10 facilitate improvement in movement, function, and ADLs as indicated by Functional Deficits. [] Progressing: [] Met: [] Not Met: [] Adjusted     Long Term Goals: To be achieved in: 8 weeks  1.  Pt will improve FOTO to

## 2022-09-26 ENCOUNTER — HOSPITAL ENCOUNTER (OUTPATIENT)
Dept: PHYSICAL THERAPY | Age: 22
Setting detail: THERAPIES SERIES
Discharge: HOME | End: 2022-09-26
Payer: MEDICAID

## 2022-09-26 PROCEDURE — 97112 NEUROMUSCULAR REEDUCATION: CPT

## 2022-09-26 PROCEDURE — 97110 THERAPEUTIC EXERCISES: CPT

## 2022-09-26 NOTE — FLOWSHEET NOTE
[address] [place] [address] and Sports Rehabilitation[address], [address] [place] [address] [address]  Phone: [phone]   Fax:     [phone]      Physical Therapy Treatment Note/ Progress Report:           Date:  2022    Patient Name:  Brisa Jimenez    :  2000  MRN: [id number]  Restrictions/Precautions:    Medical/Treatment Diagnosis Information:  Diagnosis: M25.50 (ICD-10-CM) - Hypermobility arthralgia  Treatment Diagnosis: Hypermobility, weakness  Insurance/Certification information:   [facility]  Physician Information:  SERGO Scanlon  Has the plan of care been signed (Y/N):        []  Yes  []  No     Date of Patient follow up with Physician:     Assessment Summary: Elenita Dumont is a 24 y.o. female reporting to OP PT with c/c of general pain through back and hips primairly which has been occurring since . Pt is noted to have good ROM. General weakness through hips and core. Date Range for reporting period:  Beginning   22  Ending       Recertification will be due (POC Duration  / 90 days whichever is less): 22          Visit # Insurance Allowable Auth Required   In Person  30 []  Yes     []  No    Tele Health   []  Yes     []  No    Total 2             Functional Scale: FOTO 61    Date assessed:       Latex Allergy:  [x]NO      []YES  Preferred Language for Healthcare:   [x]English       []other:      Pain level:  2/10     SUBJECTIVE:  Pt states feeling ok, joints pop a lot.        OBJECTIVE: see eval      RESTRICTIONS/PRECAUTIONS: None    Exercises/Interventions: HEP code: [id number]  Therapeutic Ex (63986) HEP 22    Warm-up       Rec bike   6', Lv 5           TABLE       SLR x 10x2 B     Bridge x x20 x20    Hip adduction x x20 x20    PPT x x20 x20    SL CS x X15 B, 5\" X15 B, BlkTB    SL Reverse CS   X20 B, 3#    Quadruped hip extension x X20 B --    Quadruped alt arm/leg   X15 B, 5\"           SEATED STANDING       Paloff Press   X5 B, 10\" GTB                                                                                                        Manual (01681): None    Therapeutic Exercise and NMR EXR  [x] (98185) Provided verbal/tactile cueing for activities related to strengthening, flexibility, endurance, ROM for improvements in LE, proximal hip, and core control with self care, mobility, lifting, ambulation. [x] (33168) Provided verbal/tactile cueing for activities related to improving balance, coordination, kinesthetic sense, posture, motor skill, proprioception to assist with LE, proximal hip, and core control in self-care, mobility, lifting, ambulation and eccentric single leg control.      NMR and Therapeutic Activities:    [x] (42261 or 85071) Provided verbal/tactile cueing for activities related to improving balance, coordination, kinesthetic sense, posture, motor skill, proprioception and motor activation to allow for proper function of core, proximal hip and LE with self-care and ADLs and functional mobility.   [] (81568) Gait Re-education- Provided training and instruction to the patient for proper LE, core and proximal hip recruitment and positioning and eccentric body weight control with ambulation re-education including up and down stairs     Home Exercise Program:    [x] (63345) Reviewed/Progressed HEP activities related to strengthening, flexibility, endurance, ROM of core, proximal hip and LE for functional self-care, mobility, lifting and ambulation/stair navigation   [] (60803) Reviewed/Progressed HEP activities related to improving balance, coordination, kinesthetic sense, posture, motor skill, proprioception of core, proximal hip and LE for self-care, mobility, lifting, and ambulation/stair navigation      Manual Treatments:  PROM / STM / Oscillations-Mobs:  G-I, II, III, IV (PA's, Inf., Post.)  [x] (95799) Provided manual therapy to mobilize LE, proximal hip and/or LS spine soft tissue/joints for the purpose of modulating pain, promoting relaxation, increasing ROM, reducing/eliminating soft tissue swelling/inflammation/restriction, improving soft tissue extensibility and allowing for proper ROM for normal function with self-care, mobility, lifting and ambulation. Modalities:     [] GAME READY (VASO)- for significant edema, swelling, pain control. Charges:  Timed Code Treatment Minutes: 45   Total Treatment Minutes:  518   [address] [facility] [address]:  TE TIME:  NMR TIME:  MANUAL TIME:   TA  UNTIMED MINUTES:  Medicare Total:   30  115      15        [] EVAL (LOW) 23670 (typically 20 minutes face-to-face)  [] EVAL (MOD) 86475 (typically 30 minutes face-to-face)  [] EVAL (HIGH) 92597 (typically 45 minutes face-to-face)  [] RE-EVAL     [x] KP(62083) 2     [] IONTO  [x] NMR (57887) 1     [] VASO  [] Manual (46930) x     [] Dry Needle:  [] TA x      [] Mech Traction (98973)  [] ES(attended) (90264)      [] ES (un) (69223):        GOALS:     Patient stated goal: Reduce pain     Therapist goals for Patient:   Short Term Goals: To be achieved in: 2 weeks  1. Independent in HEP and progression per patient tolerance, in order to prevent re-injury. [] Progressing: [] Met: [] Not Met: [] Adjusted      2. Patient will have a decrease in pain of NRPS to <4/10 facilitate improvement in movement, function, and ADLs as indicated by Functional Deficits. [] Progressing: [] Met: [] Not Met: [] Adjusted      Long Term Goals: To be achieved in: 8 weeks  1. Pt will improve FOTO to 65 or more, indicating improved functional capacity. [] Progressing: [] Met: [] Not Met: [] Adjusted      2. Patient will demonstrate an increase in Strength to hip IR/ER/abduction to 5/5 allow for proper functional mobility as indicated by patients Functional Deficits. [] Progressing: [] Met: [] Not Met: [] Adjusted      3. Patient will return to functional walking activities with NRPS of </= 1/10.    [] Progressing: [] Met: []

## 2022-09-29 ENCOUNTER — HOSPITAL ENCOUNTER (OUTPATIENT)
Dept: PHYSICAL THERAPY | Age: 22
Setting detail: THERAPIES SERIES
Discharge: HOME | End: 2022-09-29
Payer: MEDICAID

## 2022-09-29 NOTE — FLOWSHEET NOTE
[facility], [address] [place] [address], [address] [place] [address] [address]  Phone: [phone]   Fax:     [phone]    Physical Therapy  Cancellation/No-show Note  Patient Name:  Paula Willard  :  2000   Date:  2022    Cancelled visits to date: 1  No-shows to date: 0    For today's appointment patient:  [x]  Cancelled  []  Rescheduled appointment  []  No-show     Reason given by patient:  [x]  Patient ill  []  Conflicting appointment  []  No transportation    []  Conflict with work  []  No reason given  []  Other:     Comments:      Phone call information:   []  Phone call made today to patient at am/pm at the number provided:      []  Patient answered, conversation as follows:    []  Patient did not answer, message left as follows:  [x]  Phone call not needed - pt contacted us to cancel and provided reason for cancellation.      Electronically signed by:  Sunday Altman, PT, PT

## 2022-10-07 ENCOUNTER — HOSPITAL ENCOUNTER (OUTPATIENT)
Dept: PHYSICAL THERAPY | Age: 22
Setting detail: THERAPIES SERIES
Discharge: HOME | End: 2022-10-07
Payer: MEDICAID

## 2022-10-07 PROCEDURE — 97112 NEUROMUSCULAR REEDUCATION: CPT

## 2022-10-07 PROCEDURE — 97110 THERAPEUTIC EXERCISES: CPT

## 2022-10-07 NOTE — FLOWSHEET NOTE
[address] [place] [address] and Sports Rehabilitation[address], [address] [place] [address] [address]  Phone: [phone]   Fax:     [phone]      Physical Therapy Treatment Note/ Progress Report:           Date:  10/7/2022    Patient Name:  Niharika Mcdonald    :  2000  MRN: [id number]  Restrictions/Precautions:    Medical/Treatment Diagnosis Information:  Diagnosis: M25.50 (ICD-10-CM) - Hypermobility arthralgia  Treatment Diagnosis: Hypermobility, weakness  Insurance/Certification information:   [facility]  Physician Information:  SERGO Higgins  Has the plan of care been signed (Y/N):        []  Yes  []  No     Date of Patient follow up with Physician:     Assessment Summary: Shirley Fernández is a 24 y.o. female reporting to OP PT with c/c of general pain through back and hips primairly which has been occurring since . Pt is noted to have good ROM. General weakness through hips and core. Date Range for reporting period:  Beginning   22  Ending       Recertification will be due (POC Duration  / 90 days whichever is less): 22          Visit # Insurance Allowable Auth Required   In Person  30 []  Yes     []  No    Tele Health   []  Yes     []  No    Total 2             Functional Scale: FOTO 61    Date assessed:       Latex Allergy:  [x]NO      []YES  Preferred Language for Healthcare:   [x]English       []other:      Pain level:  2/10     SUBJECTIVE:  Pt states feeling ok, joints pop a lot.        OBJECTIVE:         RESTRICTIONS/PRECAUTIONS: None    Exercises/Interventions: HEP code: [id number]  Therapeutic Ex (11407) HEP 9/20/22 9/26/22 10/7/22   Warm-up       Rec bike   6', Lv 5 6', Lv 5          TABLE       SLR x 10x2 B     Bridge x x20 x20 x20   Hip adduction x x20 x20    PPT x x20 x20 x20   SL CS x X15 B, 5\" X15 B, BlkTB X15 B, BLkTB   SL Reverse CS   X20 B, 3# X20 B, 3#   SL Hip Abduction    X15 B   Quadruped hip extension x X20 B --    Quadruped alt arm/leg   X15 B, 5\"           SEATED                                                 STANDING       Paloff Press   X5 B, 10\" GTB X5 b, 10\" BTB   LIYA Extension    10x2 B, 50#   Lateral step ups                                                                                             Manual (29373): None    Therapeutic Exercise and NMR EXR  [x] (42987) Provided verbal/tactile cueing for activities related to strengthening, flexibility, endurance, ROM for improvements in LE, proximal hip, and core control with self care, mobility, lifting, ambulation. [x] (38328) Provided verbal/tactile cueing for activities related to improving balance, coordination, kinesthetic sense, posture, motor skill, proprioception to assist with LE, proximal hip, and core control in self-care, mobility, lifting, ambulation and eccentric single leg control.      NMR and Therapeutic Activities:    [x] (47073 or 32509) Provided verbal/tactile cueing for activities related to improving balance, coordination, kinesthetic sense, posture, motor skill, proprioception and motor activation to allow for proper function of core, proximal hip and LE with self-care and ADLs and functional mobility.   [] (19231) Gait Re-education- Provided training and instruction to the patient for proper LE, core and proximal hip recruitment and positioning and eccentric body weight control with ambulation re-education including up and down stairs     Home Exercise Program:    [x] (00629) Reviewed/Progressed HEP activities related to strengthening, flexibility, endurance, ROM of core, proximal hip and LE for functional self-care, mobility, lifting and ambulation/stair navigation   [] (18994) Reviewed/Progressed HEP activities related to improving balance, coordination, kinesthetic sense, posture, motor skill, proprioception of core, proximal hip and LE for self-care, mobility, lifting, and ambulation/stair navigation      Manual Treatments:  PROM / STM / Oscillations-Mobs:  G-I, II, III, IV (PA's, Inf., Post.)  [x] (94215) Provided manual therapy to mobilize LE, proximal hip and/or LS spine soft tissue/joints for the purpose of modulating pain, promoting relaxation, increasing ROM, reducing/eliminating soft tissue swelling/inflammation/restriction, improving soft tissue extensibility and allowing for proper ROM for normal function with self-care, mobility, lifting and ambulation. Modalities:     [] GAME READY (VASO)- for significant edema, swelling, pain control. Charges:  Timed Code Treatment Minutes: 45   Total Treatment Minutes:  45   BWC:  TE TIME:  NMR TIME:  MANUAL TIME:   TA  UNTIMED MINUTES:  Medicare Total:   30  15              [] EVAL (LOW) 92438 (typically 20 minutes face-to-face)  [] EVAL (MOD) 84275 (typically 30 minutes face-to-face)  [] EVAL (HIGH) 32980 (typically 45 minutes face-to-face)  [] RE-EVAL     [x] AC(46412) 2     [] IONTO  [x] NMR (80916) 1     [] VASO  [] Manual (77692) x     [] Dry Needle:  [] TA x      [] Mech Traction (23471)  [] ES(attended) (57665)      [] ES (un) (78388):        GOALS:     Patient stated goal: Reduce pain     Therapist goals for Patient:   Short Term Goals: To be achieved in: 2 weeks  1. Independent in HEP and progression per patient tolerance, in order to prevent re-injury. [] Progressing: [] Met: [] Not Met: [] Adjusted      2. Patient will have a decrease in pain of NRPS to <4/10 facilitate improvement in movement, function, and ADLs as indicated by Functional Deficits. [] Progressing: [] Met: [] Not Met: [] Adjusted      Long Term Goals: To be achieved in: 8 weeks  1. Pt will improve FOTO to 65 or more, indicating improved functional capacity. [] Progressing: [] Met: [] Not Met: [] Adjusted      2. Patient will demonstrate an increase in Strength to hip IR/ER/abduction to 5/5 allow for proper functional mobility as indicated by patients Functional Deficits.    [] Progressing: [] Met: [] Not Met: [] Adjusted      3. Patient will return to functional walking activities with NRPS of </= 1/10. [] Progressing: [] Met: [] Not Met: [] Adjusted             ASSESSMENT:  Pt arturo session well. Patient received education on their current pathology and how their condition effects them with their functional activities. Patient understood discussion and questions were answered. Patient understands their activity limitations and understands rational for treatment progression. Pt educated on plan of care and HEP, if worsening symptoms to d/c that exercise. PLAN: See eval  [x] Continue per plan of care [] Alter current plan (see comments above)  [] Plan of care initiated [] Hold pending MD visit [] Discharge      Electronically signed by:  Joseph Lozada, PT    Note: If patient does not return for scheduled/ recommended follow up visits, this note will serve as a discharge from care along with most recent update on progress.

## 2022-10-14 ENCOUNTER — HOSPITAL ENCOUNTER (OUTPATIENT)
Dept: PHYSICAL THERAPY | Age: 22
Setting detail: THERAPIES SERIES
Discharge: HOME | End: 2022-10-14
Payer: MEDICAID

## 2022-10-14 PROCEDURE — 97110 THERAPEUTIC EXERCISES: CPT

## 2022-10-14 PROCEDURE — 97112 NEUROMUSCULAR REEDUCATION: CPT

## 2022-10-14 NOTE — FLOWSHEET NOTE
[address] [place] [address] and Sports Rehabilitation[address], [address] [place] [address] [address]  Phone: [phone]   Fax:     [phone]      Physical Therapy Treatment Note/ Progress Report:           Date:  10/14/2022    Patient Name:  Vidya Rodriguez    :  2000  MRN: [id number]  Restrictions/Precautions:    Medical/Treatment Diagnosis Information:  Diagnosis: M25.50 (ICD-10-CM) - Hypermobility arthralgia  Treatment Diagnosis: Hypermobility, weakness  Insurance/Certification information:   [facility]  Physician Information:  SERGO Jules  Has the plan of care been signed (Y/N):        []  Yes  []  No     Date of Patient follow up with Physician:     Assessment Summary: Maria Guadalupe Harvey is a 24 y.o. female reporting to OP PT with c/c of general pain through back and hips primairly which has been occurring since . Pt is noted to have good ROM. General weakness through hips and core.         Date Range for reporting period:  Beginning   22  Ending       Recertification will be due (POC Duration  / 90 days whichever is less): 22          Visit # Insurance Allowable Auth Required   In Person  30 []  Yes     []  No    Tele Health   []  Yes     []  No    Total 3             Functional Scale: FOTO 61    Date assessed:       Latex Allergy:  [x]NO      []YES  Preferred Language for Healthcare:   [x]English       []other:      Pain level:  /10     SUBJECTIVE:  Pt states feels ok      OBJECTIVE:         RESTRICTIONS/PRECAUTIONS: None    Exercises/Interventions: HEP code: [id number]  Therapeutic Ex (99964) HEP 9/20/22 9/26/22 10/7/22 10/14/22   Warm-up        Rec bike   6', Lv 5 6', Lv 5            TABLE        SLR x 10x2 B      Bridge x x20 x20 x20    Bridge eccentric        Hip adduction x x20 x20     PPT x x20 x20 x20    SL CS x X15 B, 5\" X15 B, BlkTB X15 B, BLkTB    SL Reverse CS   X20 B, 3# X20 B, 3#    SL Hip Abduction    X15 B    Quadruped hip extension x X20 B --     Quadruped alt arm/leg   X15 B, 5\"             SEATED                                                        STANDING        Paloff Press   X5 B, 10\" GTB X5 B, 10\" BTB X10 B, 5\", BTB   LIYA Extension    10x2 B, 50# X20 B, 60#   LIYA Abduction     X20 B, 60#   LIYA Flexion     10x2 B, 45#   Lateral step ups        BOSU squats     10x2                                                                                             Manual (43344): None    Therapeutic Exercise and NMR EXR  [x] (93388) Provided verbal/tactile cueing for activities related to strengthening, flexibility, endurance, ROM for improvements in LE, proximal hip, and core control with self care, mobility, lifting, ambulation. [x] (72403) Provided verbal/tactile cueing for activities related to improving balance, coordination, kinesthetic sense, posture, motor skill, proprioception to assist with LE, proximal hip, and core control in self-care, mobility, lifting, ambulation and eccentric single leg control.      NMR and Therapeutic Activities:    [x] (64478 or 76199) Provided verbal/tactile cueing for activities related to improving balance, coordination, kinesthetic sense, posture, motor skill, proprioception and motor activation to allow for proper function of core, proximal hip and LE with self-care and ADLs and functional mobility.   [] (16045) Gait Re-education- Provided training and instruction to the patient for proper LE, core and proximal hip recruitment and positioning and eccentric body weight control with ambulation re-education including up and down stairs     Home Exercise Program:    [x] (12996) Reviewed/Progressed HEP activities related to strengthening, flexibility, endurance, ROM of core, proximal hip and LE for functional self-care, mobility, lifting and ambulation/stair navigation   [] (12696) Reviewed/Progressed HEP activities related to improving balance, coordination, kinesthetic sense, posture, motor skill, proprioception of core, proximal hip and LE for self-care, mobility, lifting, and ambulation/stair navigation      Manual Treatments:  PROM / STM / Oscillations-Mobs:  G-I, II, III, IV (PA's, Inf., Post.)  [x] (45795) Provided manual therapy to mobilize LE, proximal hip and/or LS spine soft tissue/joints for the purpose of modulating pain, promoting relaxation, increasing ROM, reducing/eliminating soft tissue swelling/inflammation/restriction, improving soft tissue extensibility and allowing for proper ROM for normal function with self-care, mobility, lifting and ambulation. Modalities:     [] GAME READY (VASO)- for significant edema, swelling, pain control. Charges:  Timed Code Treatment Minutes: 45   Total Treatment Minutes:  45   BWC:  TE TIME:  NMR TIME:  MANUAL TIME:   TA  UNTIMED MINUTES:  Medicare Total:   30  15              [] EVAL (LOW) 85432 (typically 20 minutes face-to-face)  [] EVAL (MOD) 51609 (typically 30 minutes face-to-face)  [] EVAL (HIGH) 35216 (typically 45 minutes face-to-face)  [] RE-EVAL     [x] SS(97217) 2     [] IONTO  [x] NMR (50273) 1     [] VASO  [] Manual (24953) x     [] Dry Needle:  [] TA x      [] Mech Traction (38847)  [] ES(attended) (60848)      [] ES (un) (48546):        GOALS:     Patient stated goal: Reduce pain     Therapist goals for Patient:   Short Term Goals: To be achieved in: 2 weeks  1. Independent in HEP and progression per patient tolerance, in order to prevent re-injury. [] Progressing: [] Met: [] Not Met: [] Adjusted      2. Patient will have a decrease in pain of NRPS to <4/10 facilitate improvement in movement, function, and ADLs as indicated by Functional Deficits. [] Progressing: [] Met: [] Not Met: [] Adjusted      Long Term Goals: To be achieved in: 8 weeks  1. Pt will improve FOTO to 65 or more, indicating improved functional capacity. [] Progressing: [] Met: [] Not Met: [] Adjusted      2.  Patient will demonstrate an increase in Strength to hip IR/ER/abduction to 5/5 allow for proper functional mobility as indicated by patients Functional Deficits. [] Progressing: [] Met: [] Not Met: [] Adjusted      3. Patient will return to functional walking activities with NRPS of </= 1/10. [] Progressing: [] Met: [] Not Met: [] Adjusted             ASSESSMENT:  Pt arturo session well. Patient received education on their current pathology and how their condition effects them with their functional activities. Patient understood discussion and questions were answered. Patient understands their activity limitations and understands rational for treatment progression. Pt educated on plan of care and HEP, if worsening symptoms to d/c that exercise. PLAN: See eval  [x] Continue per plan of care [] Alter current plan (see comments above)  [] Plan of care initiated [] Hold pending MD visit [] Discharge      Electronically signed by:  Jonna Cartagena PT    Note: If patient does not return for scheduled/ recommended follow up visits, this note will serve as a discharge from care along with most recent update on progress.

## 2022-10-28 ENCOUNTER — HOSPITAL ENCOUNTER (OUTPATIENT)
Dept: PHYSICAL THERAPY | Age: 22
Setting detail: THERAPIES SERIES
Discharge: HOME | End: 2022-10-28
Payer: MEDICAID

## 2022-10-28 PROCEDURE — 97110 THERAPEUTIC EXERCISES: CPT

## 2022-10-28 PROCEDURE — 97112 NEUROMUSCULAR REEDUCATION: CPT

## 2022-10-28 NOTE — FLOWSHEET NOTE
[address] [place] [address] and Sports Rehabilitation[address], [address] [place] [address] [address]  Phone: [phone]   Fax:     [phone]      Physical Therapy Treatment Note/ Progress Report:           Date:  10/28/2022    Patient Name:  Elsie Cardozo    :  2000  MRN: [id number]  Restrictions/Precautions:    Medical/Treatment Diagnosis Information:  Diagnosis: M25.50 (ICD-10-CM) - Hypermobility arthralgia  Treatment Diagnosis: Hypermobility, weakness  Insurance/Certification information:   [facility]  Physician Information:  SERGO Contreras  Has the plan of care been signed (Y/N):        []  Yes  []  No     Date of Patient follow up with Physician:     Assessment Summary: William Morillo is a 24 y.o. female reporting to OP PT with c/c of general pain through back and hips primairly which has been occurring since . Pt is noted to have good ROM. General weakness through hips and core. Date Range for reporting period:  Beginning   22  Ending       Recertification will be due (POC Duration  / 90 days whichever is less): 22          Visit # Insurance Allowable Auth Required   In Person  30 []  Yes     []  No    Tele Health   []  Yes     []  No    Total 5 64 units thru             Functional Scale: FOTO 61    Date assessed:       Latex Allergy:  [x]NO      []YES  Preferred Language for Healthcare:   [x]English       []other:      Pain level:  1/10     SUBJECTIVE:  Pt states feels a little sore, has been doing more at school/work.        OBJECTIVE:         RESTRICTIONS/PRECAUTIONS: None    Exercises/Interventions: HEP code: [id number]  Therapeutic Ex (95945) HEP 9/26/22 10/7/22 10/14/22 10/28/22   Warm-up        Rec bike  6', Lv 5 6', Lv 5     Elliptical     3'/3', Lv 1           TABLE        SLR x       Bridge x x20 x20  x10   Single leg brdige     X10 B   Hip adduction x x20      PPT x x20 x20  x10   PPT w/ heel taps     X20 B   SL CS x X15 B, BlkTB X15 B, BLkTB     SL Reverse CS  X20 B, 3# X20 B, 3#     SL Hip Abduction   X15 B  X10 B   Quadruped hip extension x --      Quadruped alt arm/leg  X15 B, 5\"              SEATED                                                        STANDING        Paloff Press  X5 B, 10\" GTB X5 B, 10\" BTB X10 B, 5\", BTB    LIYA Extension   10x2 B, 50# X20 B, 60#    LIYA Abduction    X20 B, 60#    LIYA Flexion    10x2 B, 45#    Lateral step ups        BOSU squats    10x2 x20   Resisted side steps     10'x3 laps, OTB   Steamboats     X15 ea B, BlkTB                                                                             Manual (40834): None    Therapeutic Exercise and NMR EXR  [x] (21681) Provided verbal/tactile cueing for activities related to strengthening, flexibility, endurance, ROM for improvements in LE, proximal hip, and core control with self care, mobility, lifting, ambulation. [x] (16313) Provided verbal/tactile cueing for activities related to improving balance, coordination, kinesthetic sense, posture, motor skill, proprioception to assist with LE, proximal hip, and core control in self-care, mobility, lifting, ambulation and eccentric single leg control.      NMR and Therapeutic Activities:    [x] (14583 or 40502) Provided verbal/tactile cueing for activities related to improving balance, coordination, kinesthetic sense, posture, motor skill, proprioception and motor activation to allow for proper function of core, proximal hip and LE with self-care and ADLs and functional mobility.   [] (10979) Gait Re-education- Provided training and instruction to the patient for proper LE, core and proximal hip recruitment and positioning and eccentric body weight control with ambulation re-education including up and down stairs     Home Exercise Program:    [x] (32800) Reviewed/Progressed HEP activities related to strengthening, flexibility, endurance, ROM of core, proximal hip and LE for functional self-care, mobility, lifting and ambulation/stair navigation   [] (95578) Reviewed/Progressed HEP activities related to improving balance, coordination, kinesthetic sense, posture, motor skill, proprioception of core, proximal hip and LE for self-care, mobility, lifting, and ambulation/stair navigation      Manual Treatments:  PROM / STM / Oscillations-Mobs:  G-I, II, III, IV (PA's, Inf., Post.)  [x] (18711) Provided manual therapy to mobilize LE, proximal hip and/or LS spine soft tissue/joints for the purpose of modulating pain, promoting relaxation, increasing ROM, reducing/eliminating soft tissue swelling/inflammation/restriction, improving soft tissue extensibility and allowing for proper ROM for normal function with self-care, mobility, lifting and ambulation. Modalities:     [] GAME READY (VASO)- for significant edema, swelling, pain control. Charges:  Timed Code Treatment Minutes: 45   Total Treatment Minutes:  45   BWC:  TE TIME:  NMR TIME:  MANUAL TIME:   TA  UNTIMED MINUTES:  Medicare Total:   30  15              [] EVAL (LOW) 31045 (typically 20 minutes face-to-face)  [] EVAL (MOD) 66380 (typically 30 minutes face-to-face)  [] EVAL (HIGH) 58514 (typically 45 minutes face-to-face)  [] RE-EVAL     [x] MW(16568) 2     [] IONTO  [x] NMR (76441) 1     [] VASO  [] Manual (12801) x     [] Dry Needle:  [] TA x      [] Mech Traction (49179)  [] ES(attended) (02884)      [] ES (un) (62633):        GOALS:     Patient stated goal: Reduce pain     Therapist goals for Patient:   Short Term Goals: To be achieved in: 2 weeks  1. Independent in HEP and progression per patient tolerance, in order to prevent re-injury. [] Progressing: [] Met: [] Not Met: [] Adjusted      2. Patient will have a decrease in pain of NRPS to <4/10 facilitate improvement in movement, function, and ADLs as indicated by Functional Deficits. [] Progressing: [] Met: [] Not Met: [] Adjusted      Long Term Goals: To be achieved in: 8 weeks  1.  Pt will improve FOTO to 65 or more, indicating improved functional capacity. [] Progressing: [] Met: [] Not Met: [] Adjusted      2. Patient will demonstrate an increase in Strength to hip IR/ER/abduction to 5/5 allow for proper functional mobility as indicated by patients Functional Deficits. [] Progressing: [] Met: [] Not Met: [] Adjusted      3. Patient will return to functional walking activities with NRPS of </= 1/10. [] Progressing: [] Met: [] Not Met: [] Adjusted             ASSESSMENT:  Pt arturo session well. Patient received education on their current pathology and how their condition effects them with their functional activities. Patient understood discussion and questions were answered. Patient understands their activity limitations and understands rational for treatment progression. Pt educated on plan of care and HEP, if worsening symptoms to d/c that exercise. PLAN: See eval  [x] Continue per plan of care [] Alter current plan (see comments above)  [] Plan of care initiated [] Hold pending MD visit [] Discharge      Electronically signed by:  Humberto Castillo PT    Note: If patient does not return for scheduled/ recommended follow up visits, this note will serve as a discharge from care along with most recent update on progress.

## 2022-11-04 ENCOUNTER — HOSPITAL ENCOUNTER (OUTPATIENT)
Dept: PHYSICAL THERAPY | Age: 22
Setting detail: THERAPIES SERIES
Discharge: HOME | End: 2022-11-04
Payer: MEDICAID

## 2022-11-04 PROCEDURE — 97110 THERAPEUTIC EXERCISES: CPT

## 2022-11-04 PROCEDURE — 97112 NEUROMUSCULAR REEDUCATION: CPT

## 2022-11-04 NOTE — FLOWSHEET NOTE
[address] [place] [address] and Sports [facility], [address] [place] [address] [address]  Phone: [phone]   Fax:     [phone]      Physical Therapy Treatment Note/ Progress Report:           Date:  2022    Patient Name:  Jose Kaufman    :  2000  MRN: [id number]  Restrictions/Precautions:    Medical/Treatment Diagnosis Information:  Diagnosis: M25.50 (ICD-10-CM) - Hypermobility arthralgia  Treatment Diagnosis: Hypermobility, weakness  Insurance/Certification information:   [facility]  Physician Information:  SERGO Ch  Has the plan of care been signed (Y/N):        []  Yes  []  No     Date of Patient follow up with Physician:     Assessment Summary: Elspeth Dubin is a 24 y.o. female reporting to OP PT with c/c of general pain through back and hips primairly which has been occurring since . Pt is noted to have good ROM. General weakness through hips and core.         Date Range for reporting period:  Beginning   22  Ending       Recertification will be due (POC Duration  / 90 days whichever is less): 22          Visit # Insurance Allowable Auth Required   In Person  30 []  Yes     []  No    Tele Health   []  Yes     []  No    Total 6 64 units thru             Functional Scale: FOTO 61    Date assessed:       Latex Allergy:  [x]NO      []YES  Preferred Language for Healthcare:   [x]English       []other:      Pain level:  2-6/10     SUBJECTIVE:  Pt states       OBJECTIVE:         RESTRICTIONS/PRECAUTIONS: None    Exercises/Interventions: HEP code: [id number]  Therapeutic Ex (19337) HEP 10/7/22 10/14/22 10/28/22 11/4/22   Warm-up        Rec bike  6', Lv 5      Elliptical    3'/3', Lv 1    Upright bike     7', Lv 7           TABLE        SLR x       Bridge x x20  x10 x10   Single leg brdige    X10 B x10   Bridge w/ SB     x10   Hs curl w/ SB        Hip adduction x       PPT x x20  x10 x15   PPT w/ heel taps    X20 B x30   SL CS x X15 B, BLkTB      SL Reverse CS  X20 B, 3#      SL Hip Abduction  X15 B  X10 B    Quadruped hip extension x       Quadruped alt arm/leg     X10 B           SEATED                                                        STANDING        Paloff Press x X5 B, 10\" BTB X10 B, 5\", BTB     LIYA Extension  10x2 B, 50# X20 B, 60#  X20 B, 60#   LIYA Abduction   X20 B, 60#  X20 B, 60#   LIYA Flexion   10x2 B, 45#     Lateral step ups        BOSU squats   10x2 x20    Resisted side steps    10'x3 laps, OTB    Steamboats x   X15 ea B, BlkTB X20 ea BlTB   Leg Press     10x3, 140#                                                                     Manual (85383): Mid thoraic pistol , lumbar roll grave V, hip distraction grade III 5'    Therapeutic Exercise and NMR EXR  [x] (70424) Provided verbal/tactile cueing for activities related to strengthening, flexibility, endurance, ROM for improvements in LE, proximal hip, and core control with self care, mobility, lifting, ambulation. [x] (95051) Provided verbal/tactile cueing for activities related to improving balance, coordination, kinesthetic sense, posture, motor skill, proprioception to assist with LE, proximal hip, and core control in self-care, mobility, lifting, ambulation and eccentric single leg control.      NMR and Therapeutic Activities:    [x] (04903 or 00079) Provided verbal/tactile cueing for activities related to improving balance, coordination, kinesthetic sense, posture, motor skill, proprioception and motor activation to allow for proper function of core, proximal hip and LE with self-care and ADLs and functional mobility.   [] (22048) Gait Re-education- Provided training and instruction to the patient for proper LE, core and proximal hip recruitment and positioning and eccentric body weight control with ambulation re-education including up and down stairs     Home Exercise Program:    [x] (50458) Reviewed/Progressed HEP activities related to strengthening, flexibility, endurance, ROM of core, proximal hip and LE for functional self-care, mobility, lifting and ambulation/stair navigation   [] (92402) Reviewed/Progressed HEP activities related to improving balance, coordination, kinesthetic sense, posture, motor skill, proprioception of core, proximal hip and LE for self-care, mobility, lifting, and ambulation/stair navigation      Manual Treatments:  PROM / STM / Oscillations-Mobs:  G-I, II, III, IV (PA's, Inf., Post.)  [x] (63053) Provided manual therapy to mobilize LE, proximal hip and/or LS spine soft tissue/joints for the purpose of modulating pain, promoting relaxation, increasing ROM, reducing/eliminating soft tissue swelling/inflammation/restriction, improving soft tissue extensibility and allowing for proper ROM for normal function with self-care, mobility, lifting and ambulation. Modalities:     [] GAME READY (VASO)- for significant edema, swelling, pain control. Charges:  Timed Code Treatment Minutes: 45   Total Treatment Minutes:  45   BWC:  TE TIME:  NMR TIME:  MANUAL TIME:   TA  UNTIMED MINUTES:  Medicare Total:   30  15              [] EVAL (LOW) 51215 (typically 20 minutes face-to-face)  [] EVAL (MOD) 32548 (typically 30 minutes face-to-face)  [] EVAL (HIGH) 63792 (typically 45 minutes face-to-face)  [] RE-EVAL     [x] (00705) 2     [] IONTO  [x] NMR (34978) 1     [] VASO  [] Manual (94675) x     [] Dry Needle:  [] TA x      [] Mech Traction (56261)  [] ES(attended) (97658)      [] ES (un) (66483):        GOALS:     Patient stated goal: Reduce pain     Therapist goals for Patient:   Short Term Goals: To be achieved in: 2 weeks  1. Independent in HEP and progression per patient tolerance, in order to prevent re-injury. [] Progressing: [] Met: [] Not Met: [] Adjusted      2. Patient will have a decrease in pain of NRPS to <4/10 facilitate improvement in movement, function, and ADLs as indicated by Functional Deficits.   [] Progressing: [] Met: [] Not Met: [] Adjusted      Long Term Goals: To be achieved in: 8 weeks  1. Pt will improve FOTO to 65 or more, indicating improved functional capacity. [] Progressing: [] Met: [] Not Met: [] Adjusted      2. Patient will demonstrate an increase in Strength to hip IR/ER/abduction to 5/5 allow for proper functional mobility as indicated by patients Functional Deficits. [] Progressing: [] Met: [] Not Met: [] Adjusted      3. Patient will return to functional walking activities with NRPS of </= 1/10. [] Progressing: [] Met: [] Not Met: [] Adjusted             ASSESSMENT:  Maritza Adler arturo session well. Overall hips remain weak from goal levels. Has been tolerating progression of therapeutic exercises well. Would conitnue to benefit from continued strengtheing and stability exercises. Patient received education on their current pathology and how their condition effects them with their functional activities. Patient understood discussion and questions were answered. Patient understands their activity limitations and understands rational for treatment progression. Pt educated on plan of care and HEP, if worsening symptoms to d/c that exercise. PLAN: See eval  [x] Continue per plan of care [] Alter current plan (see comments above)  [] Plan of care initiated [] Hold pending MD visit [] Discharge      Electronically signed by:  Cole Lockwood PT    Note: If patient does not return for scheduled/ recommended follow up visits, this note will serve as a discharge from care along with most recent update on progress.

## 2022-11-17 RX ORDER — METOPROLOL SUCCINATE 25 MG/1
TABLET, EXTENDED RELEASE ORAL
Qty: 90 TABLET | Refills: 1 | Status: SHIPPED | OUTPATIENT
Start: 2022-11-17

## 2022-11-17 NOTE — TELEPHONE ENCOUNTER
.Refill Request     CONFIRM preferrred pharmacy with the patient. If Mail Order Rx - Pend for 90 day refill. Last Seen: Last Seen Department: 8/3/2022  Last Seen by PCP: 8/3/2022    Last Written: 8-3-22 90 with 1     If no future appointment scheduled, route STAFF MESSAGE with patient name to the [facility] for scheduling. Next Appointment:   No future appointments. Message sent to [employer] to schedule appt with patient?   YES      Requested Prescriptions     Pending Prescriptions Disp Refills    metoprolol succinate (TOPROL XL) 25 MG extended release tablet [Pharmacy Med Name: Metoprolol Succinate ER 25 MG Oral Tablet Extended Release 24 Hour] 90 tablet 1     Sig: Take 1 tablet by mouth once daily

## 2023-03-26 DIAGNOSIS — R00.0 TACHYCARDIA: ICD-10-CM

## 2023-03-26 NOTE — TELEPHONE ENCOUNTER
Refill Request     CONFIRM preferred pharmacy with the patient. If Mail Order Rx - Pend for 90 day refill. Last Seen: Last Seen Department: 8/3/2022  Last Seen by PCP: 8/3/2022    Last Written: 11/17/2022    If no future appointment scheduled, route STAFF MESSAGE with patient name to the [facility] for scheduling. Next Appointment:   No future appointments. Message sent to [address] [place] [address] to schedule appt with patient?   NO      Requested Prescriptions     Pending Prescriptions Disp Refills    metoprolol succinate (TOPROL XL) 25 MG extended release tablet [Pharmacy Med Name: Metoprolol Succinate ER 25 MG Oral Tablet Extended Release 24 Hour] 90 tablet 1     Sig: Take 1 tablet by mouth once daily

## 2023-04-04 RX ORDER — METOPROLOL SUCCINATE 25 MG/1
TABLET, EXTENDED RELEASE ORAL
Qty: 90 TABLET | Refills: 1 | Status: SHIPPED | OUTPATIENT
Start: 2023-04-04

## 2023-04-04 NOTE — TELEPHONE ENCOUNTER
Diandra Willard  to [facility] Practice Support (supporting Kai Fam)    [address] [facility]      10:24 AM  No but he instructed me to take 1/2 so I have plenty which is why I didnt go pick it up from the pharmacy     From pt via [employer] message

## 2023-04-19 ENCOUNTER — OFFICE VISIT (OUTPATIENT)
Dept: FAMILY MEDICINE CLINIC | Age: 23
End: 2023-04-19
Payer: MEDICAID

## 2023-04-19 VITALS
OXYGEN SATURATION: 99 % | SYSTOLIC BLOOD PRESSURE: 90 MMHG | DIASTOLIC BLOOD PRESSURE: 78 MMHG | BODY MASS INDEX: 28.96 KG/M2 | WEIGHT: 174 LBS | HEART RATE: 78 BPM

## 2023-04-19 DIAGNOSIS — L98.9 SKIN LESION: Primary | ICD-10-CM

## 2023-04-19 DIAGNOSIS — Q79.60 EDS (EHLERS-DANLOS SYNDROME): ICD-10-CM

## 2023-04-19 DIAGNOSIS — Z12.4 CERVICAL CANCER SCREENING: ICD-10-CM

## 2023-04-19 DIAGNOSIS — G90.A POTS (POSTURAL ORTHOSTATIC TACHYCARDIA SYNDROME): ICD-10-CM

## 2023-04-19 PROBLEM — R06.02 SOB (SHORTNESS OF BREATH): Status: RESOLVED | Noted: 2022-03-22 | Resolved: 2023-04-19

## 2023-04-19 PROBLEM — R07.2 PRECORDIAL PAIN: Status: RESOLVED | Noted: 2022-03-22 | Resolved: 2023-04-19

## 2023-04-19 PROBLEM — R42 DIZZINESS: Status: RESOLVED | Noted: 2022-03-22 | Resolved: 2023-04-19

## 2023-04-19 PROCEDURE — G8419 CALC BMI OUT NRM PARAM NOF/U: HCPCS | Performed by: PHYSICIAN ASSISTANT

## 2023-04-19 PROCEDURE — 4004F PT TOBACCO SCREEN RCVD TLK: CPT | Performed by: PHYSICIAN ASSISTANT

## 2023-04-19 PROCEDURE — 99213 OFFICE O/P EST LOW 20 MIN: CPT | Performed by: PHYSICIAN ASSISTANT

## 2023-04-19 PROCEDURE — G8427 DOCREV CUR MEDS BY ELIG CLIN: HCPCS | Performed by: PHYSICIAN ASSISTANT

## 2023-04-19 RX ORDER — MIDODRINE HYDROCHLORIDE 2.5 MG/1
TABLET ORAL
COMMUNITY
Start: 2023-03-26

## 2023-04-19 ASSESSMENT — ENCOUNTER SYMPTOMS: SHORTNESS OF BREATH: 0

## 2023-04-19 ASSESSMENT — PATIENT HEALTH QUESTIONNAIRE - PHQ9
4. FEELING TIRED OR HAVING LITTLE ENERGY: 2
10. IF YOU CHECKED OFF ANY PROBLEMS, HOW DIFFICULT HAVE THESE PROBLEMS MADE IT FOR YOU TO DO YOUR WORK, TAKE CARE OF THINGS AT HOME, OR GET ALONG WITH OTHER PEOPLE: 0
3. TROUBLE FALLING OR STAYING ASLEEP: 2
SUM OF ALL RESPONSES TO PHQ9 QUESTIONS 1 & 2: 0
1. LITTLE INTEREST OR PLEASURE IN DOING THINGS: 0
SUM OF ALL RESPONSES TO PHQ QUESTIONS 1-9: 4
9. THOUGHTS THAT YOU WOULD BE BETTER OFF DEAD, OR OF HURTING YOURSELF: 0
2. FEELING DOWN, DEPRESSED OR HOPELESS: 0
SUM OF ALL RESPONSES TO PHQ QUESTIONS 1-9: 4
6. FEELING BAD ABOUT YOURSELF - OR THAT YOU ARE A FAILURE OR HAVE LET YOURSELF OR YOUR FAMILY DOWN: 0
5. POOR APPETITE OR OVEREATING: 0
7. TROUBLE CONCENTRATING ON THINGS, SUCH AS READING THE NEWSPAPER OR WATCHING TELEVISION: 0
DEPRESSION UNABLE TO ASSESS: FUNCTIONAL CAPACITY MOTIVATION LIMITS ACCURACY
8. MOVING OR SPEAKING SO SLOWLY THAT OTHER PEOPLE COULD HAVE NOTICED. OR THE OPPOSITE, BEING SO FIGETY OR RESTLESS THAT YOU HAVE BEEN MOVING AROUND A LOT MORE THAN USUAL: 0
SUM OF ALL RESPONSES TO PHQ QUESTIONS 1-9: 4
SUM OF ALL RESPONSES TO PHQ QUESTIONS 1-9: 4

## 2023-04-19 NOTE — PROGRESS NOTES
rhythm. Heart sounds: Normal heart sounds. Pulmonary:      Effort: Pulmonary effort is normal.      Breath sounds: Normal breath sounds. No wheezing. Musculoskeletal:      Right lower leg: No edema. Left lower leg: No edema. Skin:     Comments: Large mole with light, irregular border and dark central oval under right breast   Neurological:      General: No focal deficit present. Mental Status: She is alert and oriented to person, place, and time. An electronic signature was used to authenticate this note.     --SERGO Rich

## 2023-09-11 LAB — PAP SMEAR, EXTERNAL: NEGATIVE

## 2024-02-24 ENCOUNTER — HOSPITAL ENCOUNTER (EMERGENCY)
Age: 24
Discharge: HOME | End: 2024-02-24
Payer: MEDICAID

## 2024-02-24 VITALS
HEART RATE: 88 BPM | DIASTOLIC BLOOD PRESSURE: 84 MMHG | RESPIRATION RATE: 14 BRPM | OXYGEN SATURATION: 100 % | BODY MASS INDEX: 26.63 KG/M2 | TEMPERATURE: 98.3 F | SYSTOLIC BLOOD PRESSURE: 126 MMHG | WEIGHT: 160 LBS

## 2024-02-24 DIAGNOSIS — M25.561 ACUTE PAIN OF RIGHT KNEE: Primary | ICD-10-CM

## 2024-02-24 PROCEDURE — 99283 EMERGENCY DEPT VISIT LOW MDM: CPT

## 2024-02-24 ASSESSMENT — PAIN SCALES - GENERAL: PAINLEVEL_OUTOF10: 7

## 2024-02-24 ASSESSMENT — PAIN - FUNCTIONAL ASSESSMENT: PAIN_FUNCTIONAL_ASSESSMENT: 0-10

## 2024-02-24 NOTE — DISCHARGE INSTRUCTIONS
-Take 400mg Ibuprofen (Motrin) and 500mg Acetaminophen (Tylenol) every 4 hours for pain.   -Follow up with orthopedics and physical therapy.   -Come back if you feel worse.

## 2024-02-24 NOTE — ED PROVIDER NOTES
[facility]  ED  EMERGENCY DEPARTMENT ENCOUNTER        Pt Name: Suzanne Shepard  MRN: [id number]  Birthdate 2000  Date of evaluation: 2/24/2024  Provider: SERGO Munoz  PCP: Sylvia Nicole PA  Note Started: 12:42 AM EST       GENO. I have evaluated this patient.      CHIEF COMPLAINT       Chief Complaint   Patient presents with    Knee Pain     Right knee pain, happened at work when squatting.  Wants to make sure she didn't tear anything        HISTORY OF PRESENT ILLNESS      Chief Complaint: Right knee pain    Suzanne Shepard is a 23 y.o. female who presents to the emergency room for evaluation of right knee pain.  Patient was reaching down to get something off of her printer, and felt a snap/pop in her right leg.  She noticed some numbness and tingling going down into her foot, this improved with time.  She reports a burning sensation mostly on the anterior and lateral part of her knee.  She is able to ambulate with a limp.  She has a history of hypermobility, and is frequently hurting her joints.    SCREENINGS    David Coma Scale  Eye Opening: Spontaneous  Best Verbal Response: Oriented  Best Motor Response: Obeys commands  David Coma Scale Score: 15      Is this patient to be included in the SEP-1 Core Measure due to severe sepsis or septic shock?   No   Exclusion criteria - the patient is NOT to be included for SEP-1 Core Measure due to:  Infection is not suspected      PHYSICAL EXAM     Vitals: /84   Pulse 88   Temp 98.3 °F (36.8 °C) (Oral)   Resp 14   Wt 72.6 kg (160 lb)   LMP 01/25/2024   SpO2 100%   BMI 26.63 kg/m²    General: awake, alert, no apparent distress  Pupils: equal, reactive  Head: Non-traumatic  Right lower extremity: Sensation intact diffusely over the knee.  Minimal swelling to the anterior patella.  Tender to palpation over the patellar tendon.  Straight leg raise intact.  Stable medial lateral anterior and posterior.  Neuro: no facial

## 2024-02-26 ENCOUNTER — TELEPHONE (OUTPATIENT)
Dept: FAMILY MEDICINE CLINIC | Age: 24
End: 2024-02-26

## 2024-02-26 NOTE — TELEPHONE ENCOUNTER
ED Follow Up Call/ Schedule appt   ED: [facility]  Reason: Rght knee pain  Date:02/24/24    Appt scheduled: NA      Comments:   LMOM for pt to return phone call to the office    Please see if pt is following up with Ortho, offer pt an appt IF this is not a [facility] claim.     No future appointments.

## 2024-02-27 ENCOUNTER — TELEPHONE (OUTPATIENT)
Dept: FAMILY MEDICINE CLINIC | Age: 24
End: 2024-02-27

## 2024-02-27 NOTE — TELEPHONE ENCOUNTER
ED Follow Up Call/ Schedule appt   ED: [facility]  Reason: Rght knee pain  Date:02/24/24     Appt scheduled: NA        Comments:   LMOM for pt to return phone call to the office     Please see if pt is following up with Ortho, offer pt an appt IF this is not a [facility] claim.      No future appointments.

## 2024-02-28 ENCOUNTER — TELEPHONE (OUTPATIENT)
Dept: FAMILY MEDICINE CLINIC | Age: 24
End: 2024-02-28

## 2024-02-28 NOTE — TELEPHONE ENCOUNTER
ED Follow Up Call/ Schedule appt   ED: [facility]  Reason: Rght knee pain  Date:02/24/24     Appt scheduled: NA        Comments:   LMOM for pt to return phone call to the office     Please see if pt is following up with Ortho, offer pt an appt IF this is not a [facility] claim.      No future appointments.      Spoke with mother, asked her to have pt return phone call to the office no information was provided.

## 2024-03-04 ENCOUNTER — OFFICE VISIT (OUTPATIENT)
Dept: FAMILY MEDICINE CLINIC | Age: 24
End: 2024-03-04
Payer: COMMERCIAL

## 2024-03-04 VITALS
SYSTOLIC BLOOD PRESSURE: 114 MMHG | BODY MASS INDEX: 27.99 KG/M2 | DIASTOLIC BLOOD PRESSURE: 82 MMHG | WEIGHT: 168 LBS | HEART RATE: 103 BPM | OXYGEN SATURATION: 99 % | HEIGHT: 65 IN

## 2024-03-04 DIAGNOSIS — Q79.60 EDS (EHLERS-DANLOS SYNDROME): Primary | ICD-10-CM

## 2024-03-04 DIAGNOSIS — M25.561 ACUTE PAIN OF RIGHT KNEE: ICD-10-CM

## 2024-03-04 PROCEDURE — 99212 OFFICE O/P EST SF 10 MIN: CPT | Performed by: PHYSICIAN ASSISTANT

## 2024-03-04 SDOH — ECONOMIC STABILITY: FOOD INSECURITY: WITHIN THE PAST 12 MONTHS, YOU WORRIED THAT YOUR FOOD WOULD RUN OUT BEFORE YOU GOT MONEY TO BUY MORE.: NEVER TRUE

## 2024-03-04 SDOH — ECONOMIC STABILITY: FOOD INSECURITY: WITHIN THE PAST 12 MONTHS, THE FOOD YOU BOUGHT JUST DIDN'T LAST AND YOU DIDN'T HAVE MONEY TO GET MORE.: NEVER TRUE

## 2024-03-04 SDOH — ECONOMIC STABILITY: INCOME INSECURITY: HOW HARD IS IT FOR YOU TO PAY FOR THE VERY BASICS LIKE FOOD, HOUSING, MEDICAL CARE, AND HEATING?: NOT HARD AT ALL

## 2024-03-04 ASSESSMENT — PATIENT HEALTH QUESTIONNAIRE - PHQ9
3. TROUBLE FALLING OR STAYING ASLEEP: 0
1. LITTLE INTEREST OR PLEASURE IN DOING THINGS: 0
SUM OF ALL RESPONSES TO PHQ QUESTIONS 1-9: 0
DEPRESSION UNABLE TO ASSESS: FUNCTIONAL CAPACITY MOTIVATION LIMITS ACCURACY
SUM OF ALL RESPONSES TO PHQ9 QUESTIONS 1 & 2: 0
SUM OF ALL RESPONSES TO PHQ QUESTIONS 1-9: 0
8. MOVING OR SPEAKING SO SLOWLY THAT OTHER PEOPLE COULD HAVE NOTICED. OR THE OPPOSITE, BEING SO FIGETY OR RESTLESS THAT YOU HAVE BEEN MOVING AROUND A LOT MORE THAN USUAL: 0
2. FEELING DOWN, DEPRESSED OR HOPELESS: 0
SUM OF ALL RESPONSES TO PHQ QUESTIONS 1-9: 0
4. FEELING TIRED OR HAVING LITTLE ENERGY: 0
10. IF YOU CHECKED OFF ANY PROBLEMS, HOW DIFFICULT HAVE THESE PROBLEMS MADE IT FOR YOU TO DO YOUR WORK, TAKE CARE OF THINGS AT HOME, OR GET ALONG WITH OTHER PEOPLE: 0
9. THOUGHTS THAT YOU WOULD BE BETTER OFF DEAD, OR OF HURTING YOURSELF: 0
SUM OF ALL RESPONSES TO PHQ QUESTIONS 1-9: 0
6. FEELING BAD ABOUT YOURSELF - OR THAT YOU ARE A FAILURE OR HAVE LET YOURSELF OR YOUR FAMILY DOWN: 0
5. POOR APPETITE OR OVEREATING: 0

## 2024-03-04 ASSESSMENT — ENCOUNTER SYMPTOMS: COLOR CHANGE: 1

## 2024-03-04 NOTE — PROGRESS NOTES
Suzanne Shepard (:  2000) is a 23 y.o. female,Established patient, here for evaluation of the following chief complaint(s):  Other (ER follow up, 24, AMH, Right knee pain/Just had MRI today at [facility] )         ASSESSMENT/PLAN:  1. EDS (Esteban-Danlos syndrome)      -   stable, follow up in one year  2. Acute pain of right knee       -   pt is following with ortho. There were no labs or images ordered in the ER. She had an MRI today      Subjective   SUBJECTIVE/OBJECTIVE:  HPI  The patient is here for ER follow up. She was seen on  after hearing a popping sound in her knee. She was provided with a referral to see ortho. Hx of hypermobility  Current symptoms: bruised, burning sensation, feeling cold in her foot  Denies:  swelling, discoloration of the foot  Tx: naproxen for pain which is helpful  She was seen at [facility] and had an MRI today- they suspect that she has a meniscal tear      Review of Systems   Cardiovascular:  Negative for leg swelling.   Skin:  Positive for color change. Negative for wound.   Neurological:  Negative for weakness and numbness.          Objective   Physical Exam  Vitals reviewed.   Constitutional:       Appearance: Normal appearance.   Cardiovascular:      Pulses:           Posterior tibial pulses are 2+ on the right side.   Neurological:      General: No focal deficit present.      Mental Status: She is alert and oriented to person, place, and time.      Cranial Nerves: No cranial nerve deficit.                An electronic signature was used to authenticate this note.    --SERGO Pereira

## 2024-06-17 ENCOUNTER — TELEPHONE (OUTPATIENT)
Dept: FAMILY MEDICINE CLINIC | Age: 24
End: 2024-06-17